# Patient Record
Sex: MALE | Race: WHITE | Employment: UNEMPLOYED | URBAN - METROPOLITAN AREA
[De-identification: names, ages, dates, MRNs, and addresses within clinical notes are randomized per-mention and may not be internally consistent; named-entity substitution may affect disease eponyms.]

---

## 2017-12-08 PROBLEM — K92.1 GASTROINTESTINAL HEMORRHAGE WITH MELENA: Status: ACTIVE | Noted: 2017-12-08

## 2017-12-08 PROBLEM — D72.829 LEUKOCYTOSIS: Status: ACTIVE | Noted: 2017-12-08

## 2017-12-08 PROBLEM — R04.0 EPISTAXIS: Status: ACTIVE | Noted: 2017-12-08

## 2018-10-23 ENCOUNTER — APPOINTMENT (OUTPATIENT)
Dept: CT IMAGING | Age: 23
DRG: 885 | End: 2018-10-23
Payer: MEDICARE

## 2018-10-23 ENCOUNTER — HOSPITAL ENCOUNTER (INPATIENT)
Age: 23
LOS: 3 days | Discharge: HOME | DRG: 885 | End: 2018-10-27
Attending: EMERGENCY MEDICINE | Admitting: PSYCHIATRY & NEUROLOGY
Payer: MEDICARE

## 2018-10-23 DIAGNOSIS — S09.90XA INJURY OF HEAD, INITIAL ENCOUNTER: ICD-10-CM

## 2018-10-23 DIAGNOSIS — F41.1 ANXIETY STATE: ICD-10-CM

## 2018-10-23 DIAGNOSIS — J01.90 ACUTE SINUSITIS, RECURRENCE NOT SPECIFIED, UNSPECIFIED LOCATION: ICD-10-CM

## 2018-10-23 DIAGNOSIS — R45.851 SUICIDAL IDEATION: Primary | ICD-10-CM

## 2018-10-23 DIAGNOSIS — F31.9 BIPOLAR 1 DISORDER (HCC): ICD-10-CM

## 2018-10-23 LAB
A/G RATIO: 1.7 (ref 1.1–2.2)
ACETAMINOPHEN LEVEL: <5 UG/ML (ref 10–30)
ALBUMIN SERPL-MCNC: 4.9 G/DL (ref 3.4–5)
ALP BLD-CCNC: 87 U/L (ref 40–129)
ALT SERPL-CCNC: 11 U/L (ref 10–40)
AMPHETAMINE SCREEN, URINE: ABNORMAL
ANION GAP SERPL CALCULATED.3IONS-SCNC: 11 MMOL/L (ref 3–16)
AST SERPL-CCNC: 18 U/L (ref 15–37)
BARBITURATE SCREEN URINE: ABNORMAL
BASOPHILS ABSOLUTE: 0.2 K/UL (ref 0–0.2)
BASOPHILS RELATIVE PERCENT: 1.3 %
BENZODIAZEPINE SCREEN, URINE: ABNORMAL
BILIRUB SERPL-MCNC: <0.2 MG/DL (ref 0–1)
BILIRUBIN URINE: NEGATIVE
BLOOD, URINE: NEGATIVE
BUN BLDV-MCNC: 17 MG/DL (ref 7–20)
CALCIUM SERPL-MCNC: 9.6 MG/DL (ref 8.3–10.6)
CANNABINOID SCREEN URINE: POSITIVE
CHLORIDE BLD-SCNC: 99 MMOL/L (ref 99–110)
CLARITY: CLEAR
CO2: 28 MMOL/L (ref 21–32)
COCAINE METABOLITE SCREEN URINE: ABNORMAL
COLOR: YELLOW
CREAT SERPL-MCNC: 0.8 MG/DL (ref 0.9–1.3)
EOSINOPHILS ABSOLUTE: 0.5 K/UL (ref 0–0.6)
EOSINOPHILS RELATIVE PERCENT: 3 %
ETHANOL: NORMAL MG/DL (ref 0–0.08)
GFR AFRICAN AMERICAN: >60
GFR NON-AFRICAN AMERICAN: >60
GLOBULIN: 2.9 G/DL
GLUCOSE BLD-MCNC: 86 MG/DL (ref 70–99)
GLUCOSE URINE: NEGATIVE MG/DL
HCT VFR BLD CALC: 50.3 % (ref 40.5–52.5)
HEMOGLOBIN: 17 G/DL (ref 13.5–17.5)
KETONES, URINE: NEGATIVE MG/DL
LEUKOCYTE ESTERASE, URINE: NEGATIVE
LYMPHOCYTES ABSOLUTE: 3.6 K/UL (ref 1–5.1)
LYMPHOCYTES RELATIVE PERCENT: 21.4 %
Lab: ABNORMAL
MCH RBC QN AUTO: 30.1 PG (ref 26–34)
MCHC RBC AUTO-ENTMCNC: 33.9 G/DL (ref 31–36)
MCV RBC AUTO: 89 FL (ref 80–100)
METHADONE SCREEN, URINE: ABNORMAL
MICROSCOPIC EXAMINATION: NORMAL
MONOCYTES ABSOLUTE: 1 K/UL (ref 0–1.3)
MONOCYTES RELATIVE PERCENT: 5.8 %
NEUTROPHILS ABSOLUTE: 11.7 K/UL (ref 1.7–7.7)
NEUTROPHILS RELATIVE PERCENT: 68.5 %
NITRITE, URINE: NEGATIVE
OPIATE SCREEN URINE: ABNORMAL
OXYCODONE URINE: ABNORMAL
PDW BLD-RTO: 13.7 % (ref 12.4–15.4)
PH UA: 6
PH UA: 6
PHENCYCLIDINE SCREEN URINE: ABNORMAL
PLATELET # BLD: 284 K/UL (ref 135–450)
PMV BLD AUTO: 7.8 FL (ref 5–10.5)
POTASSIUM SERPL-SCNC: 4 MMOL/L (ref 3.5–5.1)
PROPOXYPHENE SCREEN: ABNORMAL
PROTEIN UA: NEGATIVE MG/DL
RBC # BLD: 5.65 M/UL (ref 4.2–5.9)
SALICYLATE, SERUM: <0.3 MG/DL (ref 15–30)
SODIUM BLD-SCNC: 138 MMOL/L (ref 136–145)
SPECIFIC GRAVITY UA: 1.02
TOTAL PROTEIN: 7.8 G/DL (ref 6.4–8.2)
URINE REFLEX TO CULTURE: NORMAL
URINE TYPE: NORMAL
UROBILINOGEN, URINE: 0.2 E.U./DL
WBC # BLD: 17 K/UL (ref 4–11)

## 2018-10-23 PROCEDURE — 85025 COMPLETE CBC W/AUTO DIFF WBC: CPT

## 2018-10-23 PROCEDURE — 80307 DRUG TEST PRSMV CHEM ANLYZR: CPT

## 2018-10-23 PROCEDURE — 81003 URINALYSIS AUTO W/O SCOPE: CPT

## 2018-10-23 PROCEDURE — 99285 EMERGENCY DEPT VISIT HI MDM: CPT

## 2018-10-23 PROCEDURE — 80053 COMPREHEN METABOLIC PANEL: CPT

## 2018-10-23 PROCEDURE — G0480 DRUG TEST DEF 1-7 CLASSES: HCPCS

## 2018-10-23 PROCEDURE — 70450 CT HEAD/BRAIN W/O DYE: CPT

## 2018-10-23 ASSESSMENT — PAIN DESCRIPTION - PAIN TYPE: TYPE: CHRONIC PAIN

## 2018-10-23 ASSESSMENT — ENCOUNTER SYMPTOMS
RESPIRATORY NEGATIVE: 1
EYE DISCHARGE: 0
EYE PAIN: 0
PHOTOPHOBIA: 0
EYE REDNESS: 0
GASTROINTESTINAL NEGATIVE: 1

## 2018-10-23 ASSESSMENT — PATIENT HEALTH QUESTIONNAIRE - PHQ9: SUM OF ALL RESPONSES TO PHQ QUESTIONS 1-9: 18

## 2018-10-23 ASSESSMENT — PAIN DESCRIPTION - LOCATION: LOCATION: ABDOMEN;FLANK

## 2018-10-24 PROBLEM — F31.9 BIPOLAR 1 DISORDER (HCC): Status: ACTIVE | Noted: 2018-10-24

## 2018-10-24 LAB
EKG ATRIAL RATE: 67 BPM
EKG DIAGNOSIS: NORMAL
EKG P AXIS: 67 DEGREES
EKG P-R INTERVAL: 154 MS
EKG Q-T INTERVAL: 374 MS
EKG QRS DURATION: 86 MS
EKG QTC CALCULATION (BAZETT): 395 MS
EKG R AXIS: 80 DEGREES
EKG T AXIS: 62 DEGREES
EKG VENTRICULAR RATE: 67 BPM

## 2018-10-24 PROCEDURE — 93005 ELECTROCARDIOGRAM TRACING: CPT | Performed by: NURSE PRACTITIONER

## 2018-10-24 PROCEDURE — 93010 ELECTROCARDIOGRAM REPORT: CPT | Performed by: INTERNAL MEDICINE

## 2018-10-24 PROCEDURE — 1240000000 HC EMOTIONAL WELLNESS R&B

## 2018-10-24 PROCEDURE — 6370000000 HC RX 637 (ALT 250 FOR IP): Performed by: PSYCHIATRY & NEUROLOGY

## 2018-10-24 PROCEDURE — 6360000002 HC RX W HCPCS: Performed by: PSYCHIATRY & NEUROLOGY

## 2018-10-24 PROCEDURE — 6370000000 HC RX 637 (ALT 250 FOR IP): Performed by: NURSE PRACTITIONER

## 2018-10-24 PROCEDURE — 99222 1ST HOSP IP/OBS MODERATE 55: CPT | Performed by: PHYSICIAN ASSISTANT

## 2018-10-24 PROCEDURE — 99223 1ST HOSP IP/OBS HIGH 75: CPT | Performed by: PSYCHIATRY & NEUROLOGY

## 2018-10-24 RX ORDER — OXYMETAZOLINE HYDROCHLORIDE 0.05 G/100ML
2 SPRAY NASAL PRN
Status: DISCONTINUED | OUTPATIENT
Start: 2018-10-24 | End: 2018-10-27 | Stop reason: HOSPADM

## 2018-10-24 RX ORDER — OLANZAPINE 5 MG/1
5 TABLET ORAL 2 TIMES DAILY PRN
Status: DISCONTINUED | OUTPATIENT
Start: 2018-10-24 | End: 2018-10-27 | Stop reason: HOSPADM

## 2018-10-24 RX ORDER — MAGNESIUM HYDROXIDE/ALUMINUM HYDROXICE/SIMETHICONE 120; 1200; 1200 MG/30ML; MG/30ML; MG/30ML
30 SUSPENSION ORAL EVERY 6 HOURS PRN
Status: DISCONTINUED | OUTPATIENT
Start: 2018-10-24 | End: 2018-10-27 | Stop reason: HOSPADM

## 2018-10-24 RX ORDER — TRAZODONE HYDROCHLORIDE 50 MG/1
50 TABLET ORAL NIGHTLY PRN
Status: DISCONTINUED | OUTPATIENT
Start: 2018-10-24 | End: 2018-10-27 | Stop reason: HOSPADM

## 2018-10-24 RX ORDER — NICOTINE 21 MG/24HR
1 PATCH, TRANSDERMAL 24 HOURS TRANSDERMAL DAILY PRN
Status: DISCONTINUED | OUTPATIENT
Start: 2018-10-24 | End: 2018-10-27 | Stop reason: HOSPADM

## 2018-10-24 RX ORDER — ACETAMINOPHEN 325 MG/1
650 TABLET ORAL EVERY 4 HOURS PRN
Status: DISCONTINUED | OUTPATIENT
Start: 2018-10-24 | End: 2018-10-27 | Stop reason: HOSPADM

## 2018-10-24 RX ORDER — HYDROXYZINE PAMOATE 50 MG/1
50 CAPSULE ORAL 3 TIMES DAILY PRN
Status: DISCONTINUED | OUTPATIENT
Start: 2018-10-24 | End: 2018-10-27 | Stop reason: HOSPADM

## 2018-10-24 RX ADMIN — HYDROXYZINE PAMOATE 50 MG: 50 CAPSULE ORAL at 15:59

## 2018-10-24 RX ADMIN — NICOTINE POLACRILEX 2 MG: 2 GUM, CHEWING BUCCAL at 20:51

## 2018-10-24 RX ADMIN — NICOTINE POLACRILEX 2 MG: 2 GUM, CHEWING BUCCAL at 14:31

## 2018-10-24 RX ADMIN — OLANZAPINE 5 MG: 5 TABLET, FILM COATED ORAL at 20:51

## 2018-10-24 RX ADMIN — HYDROXYZINE PAMOATE 50 MG: 50 CAPSULE ORAL at 05:16

## 2018-10-24 RX ADMIN — PALIPERIDONE PALMITATE 234 MG: 234 INJECTION INTRAMUSCULAR at 15:59

## 2018-10-24 RX ADMIN — TRAZODONE HYDROCHLORIDE 50 MG: 50 TABLET ORAL at 20:51

## 2018-10-24 ASSESSMENT — SLEEP AND FATIGUE QUESTIONNAIRES
DIFFICULTY FALLING ASLEEP: YES
DIFFICULTY STAYING ASLEEP: YES
DO YOU USE A SLEEP AID: NO
AVERAGE NUMBER OF SLEEP HOURS: 3
DO YOU HAVE DIFFICULTY SLEEPING: YES
DO YOU HAVE DIFFICULTY SLEEPING: YES
DIFFICULTY FALLING ASLEEP: YES
RESTFUL SLEEP: NO
SLEEP PATTERN: DIFFICULTY FALLING ASLEEP;DISTURBED/INTERRUPTED SLEEP;EARLY AWAKENING;NIGHTMARES/TERRORS
DIFFICULTY ARISING: NO
DIFFICULTY ARISING: NO
DO YOU USE A SLEEP AID: NO
SLEEP PATTERN: DIFFICULTY FALLING ASLEEP;DISTURBED/INTERRUPTED SLEEP;RESTLESSNESS
DIFFICULTY STAYING ASLEEP: YES
RESTFUL SLEEP: NO
AVERAGE NUMBER OF SLEEP HOURS: 1

## 2018-10-24 ASSESSMENT — LIFESTYLE VARIABLES
HISTORY_ALCOHOL_USE: NO
HISTORY_ALCOHOL_USE: YES

## 2018-10-24 ASSESSMENT — PATIENT HEALTH QUESTIONNAIRE - PHQ9
SUM OF ALL RESPONSES TO PHQ QUESTIONS 1-9: 22
SUM OF ALL RESPONSES TO PHQ QUESTIONS 1-9: 18

## 2018-10-24 ASSESSMENT — PAIN SCALES - GENERAL: PAINLEVEL_OUTOF10: 0

## 2018-10-24 NOTE — ED NOTES
Patient [facility] and cooperative. Shown to room BH3, explained process. No questions.       Govind Singleton RN  10/24/18 8736

## 2018-10-24 NOTE — PLAN OF CARE
Problem: Altered Mood, Psychotic Behavior:  Goal: Able to verbalize reality based thinking  Able to verbalize reality based thinking   Outcome: Ongoing                                                                      Group Therapy Note    Date: 10/24/2018  Start Time: 2:30PM  End Time:  3:30PM  Number of Participants: 5    Type of Group: Relapse Prevention    Patient's Goal:  Patients were guided in mindfulness stretching, breathing, and practiced mindfully listening to music during group. Group discussed using mindfulness and listening to music as coping skills and strategies. Notes:  Pt was engaged in group activity and then reported not feeling well stating he was nauseous. Pt left group to rest and was encouraged to go speak with nurse. Status After Intervention:  Improved    Participation Level:  Active Listener and Interactive    Participation Quality: Appropriate and Attentive      Speech:  normal      Thought Process/Content: Logical      Affective Functioning: Flat      Mood: depressed      Level of consciousness:  Alert and Oriented x4      Response to Learning: Progressing to goal      Endings: None Reported    Modes of Intervention: Education, Support, Socialization, Exploration, Activity and Movement      Discipline Responsible: /Counselor      Signature:  RIKI Lincoln, CRISS

## 2018-10-24 NOTE — ED PROVIDER NOTES
below     Oxycodone Urine Neg Negative <100 ng/ml   Urine, reflex to culture   Result Value Ref Range    Color, UA Yellow Straw/Yellow    Clarity, UA Clear Clear    Glucose, Ur Negative Negative mg/dL    Bilirubin Urine Negative Negative    Ketones, Urine Negative Negative mg/dL    Specific Gravity, UA 1.025 1.005 - 1.030    Blood, Urine Negative Negative    pH, UA 6.0 5.0 - 8.0    Protein, UA Negative Negative mg/dL    Urobilinogen, Urine 0.2 <2.0 E.U./dL    Nitrite, Urine Negative Negative    Leukocyte Esterase, Urine Negative Negative    Microscopic Examination Not Indicated     Urine Reflex to Culture Not Indicated     Urine Type Not Specified    CBC Auto Differential   Result Value Ref Range    WBC 10.0 4.0 - 11.0 K/uL    RBC 5.42 4.20 - 5.90 M/uL    Hemoglobin 16.5 13.5 - 17.5 g/dL    Hematocrit 48.2 40.5 - 52.5 %    MCV 88.9 80.0 - 100.0 fL    MCH 30.4 26.0 - 34.0 pg    MCHC 34.2 31.0 - 36.0 g/dL    RDW 13.6 12.4 - 15.4 %    Platelets 785 919 - 255 K/uL    MPV 8.0 5.0 - 10.5 fL    PLATELET SLIDE REVIEW Adequate     SLIDE REVIEW see below     Neutrophils % 52.0 %    Lymphocytes % 33.0 %    Monocytes % 5.0 %    Eosinophils % 5.0 %    Basophils % 2.0 %    Neutrophils # 5.2 1.7 - 7.7 K/uL    Lymphocytes # 3.6 1.0 - 5.1 K/uL    Monocytes # 0.5 0.0 - 1.3 K/uL    Eosinophils # 0.5 0.0 - 0.6 K/uL    Basophils # 0.2 0.0 - 0.2 K/uL    Atypical Lymphocytes Relative 3 0 - 6 %    Anisocytosis Occasional (A)     Poikilocytes 1+ (A)    Lipase   Result Value Ref Range    Lipase 31.0 13.0 - 60.0 U/L   Hepatic Function Panel   Result Value Ref Range    Total Protein 6.9 6.4 - 8.2 g/dL    Alb 4.3 3.4 - 5.0 g/dL    Alkaline Phosphatase 76 40 - 129 U/L    ALT 10 10 - 40 U/L    AST 17 15 - 37 U/L    Total Bilirubin <0.2 0.0 - 1.0 mg/dL    Bilirubin, Direct <0.2 0.0 - 0.3 mg/dL    Bilirubin, Indirect see below 0.0 - 1.0 mg/dL   EKG 12 lead   Result Value Ref Range    Ventricular Rate 67 BPM    Atrial Rate 67 BPM    P-R Interval 154

## 2018-10-24 NOTE — BH NOTE
regularly since then. He hears a nonhuman voice in the back of his head telling him to hurt himself and others, as well as telling him there is nowhere to go, and he has no control of himself. He stated he does not want to act on the voices, but he has an \"urge\" to do it. He stated he does not feel safe with himself. He stated he has impulsively acted on the command hallucinations in the past, and he is afraid he will do so again. These voices and pt's mood has gotten so bad the past couple weeks, so he had a relative lock up his guns, and he sent his girlfriend and child away from his apartment. He has never been violent toward his girlfriend or child, but he feels that he is a growing risk to them. He stated he has been avoiding interactions with others, because he is afraid he will not be able to control himself and will impulsively act on the voices and hurt someone. He was trying to ignore the command hallucinations earlier this evening and asked family to bring him to the [facility]. Nobody would, so he called 911 and had himself brought in for treatment. He repeatedly hit his face against a fence before the police arrived and still felt the urge to try hurting himself once he arrived in the ER. He is compliant and states that he wants to get help, so he can be there for his daughter, nieces and nephews. He reports he has only been sleeping less than 20 mins a day for the past 1 1/2 weeks, because he feels restless and full of energy and just can't relax. He stated he has not been eating much and has not eaten at all in the past couple of days, because the voices in his head tells him he can't eat. He stated he was able to eat the food given to him here in the ED, but it is not settling well in his stomach. He stated he does not know why he was able to eat the food here (\"I don't know; I just ate it\"). He stated he has been showering significantly more lately.  He typically takes up to 2 showers a day, but lately he has been showering 5 or more times each day.

## 2018-10-24 NOTE — H&P
Hospital Medicine History & Physical      PCP: No primary care provider on file. Date of Admission: 10/23/2018    Date of Service: Pt seen/examined on 10/24/2018    Chief Complaint:    Chief Complaint   Patient presents with    Suicide Attempt     pt brought in by police; pt called police and told them that he was suicidal and didn't care how he would kill himself; \"I kill myself by any means possible\"; pt was afraid that he would hurt his daughter while he was killing himself    Facial Injury     when police arrived; pt hit his face several times on a post         History Of Present Illness: The patient is a 26 y.o. male with ADHD, borderline personality disorder, depression and schizophrenia who presented to [facility] with complaint of SI. Patient was seen and evaluated in the ED by the ED medical provider, patient was medically cleared for admission to [facility]. This note serves as an admission medical H&P.    PCP: none  Tobacco use: yes  ETOH use: denies  Illicit drug use: denies    Patient just had a nose bleed, states its controlled now     Past Medical History:        Diagnosis Date    ADHD (attention deficit hyperactivity disorder)     [facility]     Borderline personality disorder ([facility] [facility]     Depression     Schizophrenia ([facility] [address]        Past Surgical History:        Procedure Laterality Date    HAND SURGERY         Medications Prior to Admission:    Prior to Admission medications    Medication Sig Start Date End Date Taking? Authorizing Provider   pantoprazole (PROTONIX) 20 MG tablet Take 1 tablet by mouth daily 12/8/17   Art Lock MD       Allergies:  Morphine    Social History:  The patient currently lives at home     TOBACCO:   reports that he has been smoking Cigarettes. He has a 0.25 pack-year smoking history. He has never used smokeless tobacco.  ETOH:   reports that he does not drink alcohol.       Family History:   Positive as follows:    Family History   Problem Relation 72 hours. APTT: No results for input(s): APTT in the last 72 hours. UA:  Recent Labs      10/23/18   2203   COLORU  Yellow   PHUR  6.0  6.0   CLARITYU  Clear   SPECGRAV  1.025   LEUKOCYTESUR  Negative   UROBILINOGEN  0.2   BILIRUBINUR  Negative   BLOODU  Negative   GLUCOSEU  Negative          U/A:    Lab Results   Component Value Date    COLORU Yellow 10/23/2018    CLARITYU Clear 10/23/2018    SPECGRAV 1.025 10/23/2018    LEUKOCYTESUR Negative 10/23/2018    BLOODU Negative 10/23/2018    GLUCOSEU Negative 10/23/2018       CULTURES  None     EKG:  I have reviewed the EKG with the following interpretation:   NSR, normal intervals, mild ST elevation appears to be associated with early repolarization    RADIOLOGY  CT Head WO Contrast   Final Result   No acute intracranial abnormality.                ASSESSMENT/PLAN:  BAD  Borderline Personality Disorder  - per psychiatry team    Abdominal Pain   - Repeat labs   - Normal on admission   - low concerns for acute intra-abdominal pathology   - Normal BMs and tolerating PO     Abnormal EKG   - Low concerns for ACS or pericarditis, Asx and no risk factors   - Appears consistent with early repolarization, will continue to monitor closely      Leukocytosis   - Repeat labs     Epistaxis   - Stable   - no signs of posterior bleed  - Add afrin prn     Tobacco Abuse  - Nicorette gum   - Counseled on cessation       Evgeny Branham PA-C  10/24/2018 2:00 PM

## 2018-10-25 LAB
ALBUMIN SERPL-MCNC: 4.3 G/DL (ref 3.4–5)
ALP BLD-CCNC: 76 U/L (ref 40–129)
ALT SERPL-CCNC: 10 U/L (ref 10–40)
ANISOCYTOSIS: ABNORMAL
AST SERPL-CCNC: 17 U/L (ref 15–37)
ATYPICAL LYMPHOCYTE RELATIVE PERCENT: 3 % (ref 0–6)
BASOPHILS ABSOLUTE: 0.2 K/UL (ref 0–0.2)
BASOPHILS RELATIVE PERCENT: 2 %
BILIRUB SERPL-MCNC: <0.2 MG/DL (ref 0–1)
BILIRUBIN DIRECT: <0.2 MG/DL (ref 0–0.3)
BILIRUBIN, INDIRECT: NORMAL MG/DL (ref 0–1)
EOSINOPHILS ABSOLUTE: 0.5 K/UL (ref 0–0.6)
EOSINOPHILS RELATIVE PERCENT: 5 %
HCT VFR BLD CALC: 48.2 % (ref 40.5–52.5)
HEMOGLOBIN: 16.5 G/DL (ref 13.5–17.5)
LIPASE: 31 U/L (ref 13–60)
LYMPHOCYTES ABSOLUTE: 3.6 K/UL (ref 1–5.1)
LYMPHOCYTES RELATIVE PERCENT: 33 %
MCH RBC QN AUTO: 30.4 PG (ref 26–34)
MCHC RBC AUTO-ENTMCNC: 34.2 G/DL (ref 31–36)
MCV RBC AUTO: 88.9 FL (ref 80–100)
MONOCYTES ABSOLUTE: 0.5 K/UL (ref 0–1.3)
MONOCYTES RELATIVE PERCENT: 5 %
NEUTROPHILS ABSOLUTE: 5.2 K/UL (ref 1.7–7.7)
NEUTROPHILS RELATIVE PERCENT: 52 %
PDW BLD-RTO: 13.6 % (ref 12.4–15.4)
PLATELET # BLD: 243 K/UL (ref 135–450)
PLATELET SLIDE REVIEW: ADEQUATE
PMV BLD AUTO: 8 FL (ref 5–10.5)
POIKILOCYTES: ABNORMAL
RBC # BLD: 5.42 M/UL (ref 4.2–5.9)
SLIDE REVIEW: ABNORMAL
TOTAL PROTEIN: 6.9 G/DL (ref 6.4–8.2)
WBC # BLD: 10 K/UL (ref 4–11)

## 2018-10-25 PROCEDURE — 85025 COMPLETE CBC W/AUTO DIFF WBC: CPT

## 2018-10-25 PROCEDURE — 80076 HEPATIC FUNCTION PANEL: CPT

## 2018-10-25 PROCEDURE — 6370000000 HC RX 637 (ALT 250 FOR IP): Performed by: PSYCHIATRY & NEUROLOGY

## 2018-10-25 PROCEDURE — 83690 ASSAY OF LIPASE: CPT

## 2018-10-25 PROCEDURE — 6370000000 HC RX 637 (ALT 250 FOR IP): Performed by: NURSE PRACTITIONER

## 2018-10-25 PROCEDURE — 36415 COLL VENOUS BLD VENIPUNCTURE: CPT

## 2018-10-25 PROCEDURE — 1240000000 HC EMOTIONAL WELLNESS R&B

## 2018-10-25 RX ADMIN — NICOTINE POLACRILEX 2 MG: 2 GUM, CHEWING BUCCAL at 15:43

## 2018-10-25 RX ADMIN — OLANZAPINE 5 MG: 5 TABLET, FILM COATED ORAL at 20:43

## 2018-10-25 RX ADMIN — NICOTINE POLACRILEX 2 MG: 2 GUM, CHEWING BUCCAL at 19:11

## 2018-10-25 RX ADMIN — TRAZODONE HYDROCHLORIDE 50 MG: 50 TABLET ORAL at 20:43

## 2018-10-25 NOTE — H&P
been violent towards them, but he  feels there is a growing risk to them because of his thoughts. He was  trying to ignore the command hallucinations and asked the family to  bring him to the hospital, but nobody wants to call 911. He states he  has been sleeping 20 minutes a day for the past couple of weeks and  feels restless and cannot relax. He states that his appetite has been  poor because the voices in the head had told him not to eat. PRIOR PSYCHIATRIC HISTORY:  Inpatient [facility] 12/09/2014 to 12/15/2014. He was at [facility] in 10/2014. He has been to [employer] as a teenager. He has not been to any outpatient, but had been  to [facility] as a teenager. FAMILY PSYCH HISTORY:  History of abuse in the family setting. Father  is alcoholic, domestic violence. SOCIAL HISTORY:  Lives with his aunt and cousin in [place]. DRUGS AND ALCOHOL:  He denies any use of cigarettes. TRAUMA HISTORY:  Denies any trauma towards him. FAMILY HISTORY:  Family history of suicides. A 34year-old cousin  attempted a couple of weeks ago. CURRENT MEDICATIONS:  None. MEDICAL HISTORY:  He states he is healthy. ALLERGIES TO MEDS:  MORPHINE.    LEGAL ISSUES:  He denied. REVIEW OF SYSTEMS:  Pertinent positives as mentioned in the HPI,  otherwise negative. PHYSICAL EXAMINATION:  Per CHERRI Gramajo, on 10/24/2018. VITAL SIGNS:  Noted. LABORATORY DATA:  Laboratories reviewed. Drug screen was negative and  no alcohol. MENTAL STATUS EXAMINATION:  The patient is a 25-year-old white male  who is cooperative. He appeared to be somewhat antsy and fidgety. He  had difficulty sitting still. He said his mood was okay. Affect was  elevated. No abnormal movements. Normal gait. Fund of knowledge and  language were fair. Attention and concentration was adequate. He was  able to recall three objects immediately.   He denied being homicidal,  but states he

## 2018-10-25 NOTE — PLAN OF CARE
Problem: Altered Mood, Psychotic Behavior:  Goal: Absence of self-harm  Absence of self-harm   Outcome: Ongoing  Pt denies SI and has been safe throughout the shift. Goal: Patient specific goal  Patient specific goal   Outcome: Ongoing  Pt set goal for the day of ignoring negative thoughts and attending groups. Pt met both of these goals stating he has not had any thoughts telling him to harm himself and attended psychotherapy. Pt states he will continue to try and ignore these negative thoughts.

## 2018-10-25 NOTE — PLAN OF CARE
Problem: Altered Mood, Psychotic Behavior:  Goal: Able to verbalize reality based thinking  Able to verbalize reality based thinking   Outcome: Ongoing                                                                      Group Therapy Note    Date: 10/25/2018  Start Time: 11:05 am   End Time:  11:55 am   Number of Participants: 10     Type of Group: Psychotherapy     Patient's Goal:  Pt will improve interpersonal interactions through group processing and agenda setting.      Notes:  Pt participated in group discussion, provided supportive feedback, and addressed his agenda within the group. Status After Intervention:  Unchanged    Participation Level:  Active Listener and Interactive    Participation Quality: Appropriate, Attentive and Sharing      Speech:  normal      Thought Process/Content: Linear      Affective Functioning: Congruent      Mood: euthymic      Level of consciousness:  Alert      Response to Learning: Able to verbalize current knowledge/experience      Endings: None Reported    Modes of Intervention: Education, Support, Socialization, Exploration, Clarifying, Problem-solving, Activity and Movement      Discipline Responsible: /Counselor      Signature:  Loyda Stokes, [facility]

## 2018-10-26 VITALS
BODY MASS INDEX: 21.13 KG/M2 | WEIGHT: 156 LBS | RESPIRATION RATE: 16 BRPM | SYSTOLIC BLOOD PRESSURE: 125 MMHG | OXYGEN SATURATION: 96 % | HEART RATE: 88 BPM | DIASTOLIC BLOOD PRESSURE: 70 MMHG | TEMPERATURE: 98.5 F | HEIGHT: 72 IN

## 2018-10-26 PROBLEM — F39 PSYCHOTIC AFFECTIVE DISORDER (HCC): Status: ACTIVE | Noted: 2018-10-26

## 2018-10-26 PROCEDURE — 99233 SBSQ HOSP IP/OBS HIGH 50: CPT | Performed by: PSYCHIATRY & NEUROLOGY

## 2018-10-26 PROCEDURE — 6370000000 HC RX 637 (ALT 250 FOR IP): Performed by: NURSE PRACTITIONER

## 2018-10-26 PROCEDURE — 1240000000 HC EMOTIONAL WELLNESS R&B

## 2018-10-26 PROCEDURE — 6370000000 HC RX 637 (ALT 250 FOR IP): Performed by: PSYCHIATRY & NEUROLOGY

## 2018-10-26 RX ORDER — NICOTINE 21 MG/24HR
1 PATCH, TRANSDERMAL 24 HOURS TRANSDERMAL DAILY PRN
Qty: 30 PATCH | Refills: 3 | Status: ON HOLD | OUTPATIENT
Start: 2018-10-26 | End: 2019-05-21 | Stop reason: HOSPADM

## 2018-10-26 RX ADMIN — TRAZODONE HYDROCHLORIDE 50 MG: 50 TABLET ORAL at 21:12

## 2018-10-26 RX ADMIN — NICOTINE POLACRILEX 2 MG: 2 GUM, CHEWING BUCCAL at 11:51

## 2018-10-26 RX ADMIN — NICOTINE POLACRILEX 2 MG: 2 GUM, CHEWING BUCCAL at 09:01

## 2018-10-26 RX ADMIN — NICOTINE POLACRILEX 2 MG: 2 GUM, CHEWING BUCCAL at 13:58

## 2018-10-26 RX ADMIN — NICOTINE POLACRILEX 2 MG: 2 GUM, CHEWING BUCCAL at 17:30

## 2018-10-26 RX ADMIN — NICOTINE POLACRILEX 2 MG: 2 GUM, CHEWING BUCCAL at 19:23

## 2018-10-26 NOTE — PROGRESS NOTES
mg/dL  Toxic: >30.0 mg/dL      Acetaminophen Level 10/23/2018 <5* 10 - 30 ug/mL Final    Comment: Therapeutic Range: 10.0-30.0 ug/mL  Toxic: >200.0 ug/mL      Amphetamine Screen, Urine 10/23/2018 Neg  Negative <1000ng/mL Final    Barbiturate Screen, Ur 10/23/2018 Neg  Negative <200 ng/mL Final    Benzodiazepine Screen, Urine 10/23/2018 Neg  Negative <200 ng/mL Final    Cannabinoid Scrn, Ur 10/23/2018 POSITIVE* Negative <50 ng/mL Final    Cocaine Metabolite Screen, Urine 10/23/2018 Neg  Negative <300 ng/mL Final    Opiate Scrn, Ur 10/23/2018 Neg  Negative <300 ng/mL Final    Comment: \"Therapeutic levels of pain medication, especially oxycontin and synthetic  opioids, may not be detected by this Methodology. Pain management screen  panel  Drug panel-PM-Hi Res Ur, Interp (PAIN) should be considered for drug  monitoring \".  PCP Screen, Urine 10/23/2018 Neg  Negative <25 ng/mL Final    Methadone Screen, Urine 10/23/2018 Neg  Negative <300 ng/mL Final    Propoxyphene Scrn, Ur 10/23/2018 Neg  Negative <300 ng/mL Final    pH, UA 10/23/2018 6.0   Final    Comment: Urine pH less than 5.0 or greater than 8.0 may indicate sample adulteration. Another sample should be collected if clinically  indicated.  Drug Screen Comment: 10/23/2018 see below   Final    Comment: This method is a screening test to detect only these drug  classes as part of a medical workup. Confirmatory testing  by another method should be ordered if clinically indicated.       Oxycodone Urine 10/23/2018 Neg  Negative <100 ng/ml Final    Color, UA 10/23/2018 Yellow  Straw/Yellow Final    Clarity, UA 10/23/2018 Clear  Clear Final    Glucose, Ur 10/23/2018 Negative  Negative mg/dL Final    Bilirubin Urine 10/23/2018 Negative  Negative Final    Ketones, Urine 10/23/2018 Negative  Negative mg/dL Final    Specific Gravity, UA 10/23/2018 1.025  1.005 - 1.030 Final    Blood, Urine 10/23/2018 Negative  Negative Final    pH, UA 10/23/2018 mL, 30 mL, Oral, Daily PRN  aluminum & magnesium hydroxide-simethicone (MAALOX) 200-200-20 MG/5ML suspension 30 mL, 30 mL, Oral, Q6H PRN  nicotine (NICODERM CQ) 21 MG/24HR 1 patch, 1 patch, Transdermal, Daily PRN  nicotine polacrilex (NICORETTE) gum 2 mg, 2 mg, Oral, Q2H PRN  traZODone (DESYREL) tablet 50 mg, 50 mg, Oral, Nightly PRN  oxymetazoline (AFRIN) 0.05 % nasal spray 2 spray, 2 spray, Nasal, PRN    ASSESSMENT AND PLAN    Principal Problem:    Bipolar I disorder, most recent episode (or current) depressed, severe, specified as with psychotic behavior ([facility] [address]  Active Problems:    Borderline personality disorder (HCC)    Suicidal ideation    Abnormal EKG    Tobacco abuse    Abdominal pain  Resolved Problems:    * No resolved hospital problems. *       1. Patient s symptoms   are improving  2. Probable discharge is tomorrow  3. Discharge planning is complete  4. Suicidal ideation is better  5. Total time with patient was 40 minutes and more than 50 % of that time was spent counseling the patient on their symptoms, treatment and expected goals.

## 2018-10-27 PROCEDURE — 6360000002 HC RX W HCPCS: Performed by: PSYCHIATRY & NEUROLOGY

## 2018-10-27 PROCEDURE — 6370000000 HC RX 637 (ALT 250 FOR IP): Performed by: NURSE PRACTITIONER

## 2018-10-27 RX ADMIN — NICOTINE POLACRILEX 2 MG: 2 GUM, CHEWING BUCCAL at 08:38

## 2018-10-27 RX ADMIN — PALIPERIDONE PALMITATE 156 MG: 156 INJECTION INTRAMUSCULAR at 08:32

## 2018-10-27 NOTE — PLAN OF CARE
Problem: Altered Mood, Psychotic Behavior:  Goal: Compliance with prescribed medication regimen will improve  Compliance with prescribed medication regimen will improve   Outcome: Ongoing  Patient compliant with invega injection. Patient being discharged tomorrow. Discharge summary gone over with patient. Patient verbalizes an understanding. Will continue to monitor.

## 2018-10-31 ENCOUNTER — HOSPITAL ENCOUNTER (INPATIENT)
Age: 23
LOS: 2 days | Discharge: HOME | DRG: 885 | End: 2018-11-02
Attending: EMERGENCY MEDICINE | Admitting: PSYCHIATRY & NEUROLOGY
Payer: MEDICARE

## 2018-10-31 DIAGNOSIS — F33.3 SEVERE EPISODE OF RECURRENT MAJOR DEPRESSIVE DISORDER, WITH PSYCHOTIC FEATURES (HCC): ICD-10-CM

## 2018-10-31 DIAGNOSIS — R45.851 SUICIDAL IDEATION: Primary | ICD-10-CM

## 2018-10-31 PROBLEM — F33.9 MDD (MAJOR DEPRESSIVE DISORDER), RECURRENT EPISODE (HCC): Status: ACTIVE | Noted: 2018-10-31

## 2018-10-31 LAB
A/G RATIO: 1.9 (ref 1.1–2.2)
ALBUMIN SERPL-MCNC: 4.8 G/DL (ref 3.4–5)
ALP BLD-CCNC: 70 U/L (ref 40–129)
ALT SERPL-CCNC: 30 U/L (ref 10–40)
AMPHETAMINE SCREEN, URINE: ABNORMAL
ANION GAP SERPL CALCULATED.3IONS-SCNC: 13 MMOL/L (ref 3–16)
AST SERPL-CCNC: 29 U/L (ref 15–37)
BARBITURATE SCREEN URINE: ABNORMAL
BASOPHILS ABSOLUTE: 0.1 K/UL (ref 0–0.2)
BASOPHILS RELATIVE PERCENT: 0.6 %
BENZODIAZEPINE SCREEN, URINE: ABNORMAL
BILIRUB SERPL-MCNC: <0.2 MG/DL (ref 0–1)
BUN BLDV-MCNC: 13 MG/DL (ref 7–20)
CALCIUM SERPL-MCNC: 9.3 MG/DL (ref 8.3–10.6)
CANNABINOID SCREEN URINE: POSITIVE
CHLORIDE BLD-SCNC: 98 MMOL/L (ref 99–110)
CO2: 30 MMOL/L (ref 21–32)
COCAINE METABOLITE SCREEN URINE: ABNORMAL
CREAT SERPL-MCNC: 0.7 MG/DL (ref 0.9–1.3)
EOSINOPHILS ABSOLUTE: 0.4 K/UL (ref 0–0.6)
EOSINOPHILS RELATIVE PERCENT: 3.1 %
ETHANOL: NORMAL MG/DL (ref 0–0.08)
GFR AFRICAN AMERICAN: >60
GFR NON-AFRICAN AMERICAN: >60
GLOBULIN: 2.5 G/DL
GLUCOSE BLD-MCNC: 110 MG/DL (ref 70–99)
HCT VFR BLD CALC: 48.1 % (ref 40.5–52.5)
HEMOGLOBIN: 16.4 G/DL (ref 13.5–17.5)
LYMPHOCYTES ABSOLUTE: 2.5 K/UL (ref 1–5.1)
LYMPHOCYTES RELATIVE PERCENT: 20.4 %
Lab: ABNORMAL
MCH RBC QN AUTO: 30.3 PG (ref 26–34)
MCHC RBC AUTO-ENTMCNC: 34 G/DL (ref 31–36)
MCV RBC AUTO: 89.1 FL (ref 80–100)
METHADONE SCREEN, URINE: ABNORMAL
MONOCYTES ABSOLUTE: 1 K/UL (ref 0–1.3)
MONOCYTES RELATIVE PERCENT: 7.9 %
NEUTROPHILS ABSOLUTE: 8.2 K/UL (ref 1.7–7.7)
NEUTROPHILS RELATIVE PERCENT: 68 %
OPIATE SCREEN URINE: ABNORMAL
OXYCODONE URINE: ABNORMAL
PDW BLD-RTO: 14.2 % (ref 12.4–15.4)
PH UA: 7
PHENCYCLIDINE SCREEN URINE: ABNORMAL
PLATELET # BLD: 244 K/UL (ref 135–450)
PMV BLD AUTO: 7.5 FL (ref 5–10.5)
POTASSIUM SERPL-SCNC: 4.1 MMOL/L (ref 3.5–5.1)
PROPOXYPHENE SCREEN: ABNORMAL
RBC # BLD: 5.4 M/UL (ref 4.2–5.9)
SODIUM BLD-SCNC: 141 MMOL/L (ref 136–145)
TOTAL PROTEIN: 7.3 G/DL (ref 6.4–8.2)
WBC # BLD: 12.1 K/UL (ref 4–11)

## 2018-10-31 PROCEDURE — 80307 DRUG TEST PRSMV CHEM ANLYZR: CPT

## 2018-10-31 PROCEDURE — 6370000000 HC RX 637 (ALT 250 FOR IP): Performed by: PSYCHIATRY & NEUROLOGY

## 2018-10-31 PROCEDURE — 1240000000 HC EMOTIONAL WELLNESS R&B

## 2018-10-31 PROCEDURE — 80053 COMPREHEN METABOLIC PANEL: CPT

## 2018-10-31 PROCEDURE — 85025 COMPLETE CBC W/AUTO DIFF WBC: CPT

## 2018-10-31 PROCEDURE — 99285 EMERGENCY DEPT VISIT HI MDM: CPT

## 2018-10-31 PROCEDURE — G0480 DRUG TEST DEF 1-7 CLASSES: HCPCS

## 2018-10-31 RX ORDER — TRAZODONE HYDROCHLORIDE 50 MG/1
50 TABLET ORAL NIGHTLY PRN
Status: DISCONTINUED | OUTPATIENT
Start: 2018-10-31 | End: 2018-11-02 | Stop reason: HOSPADM

## 2018-10-31 RX ORDER — MAGNESIUM HYDROXIDE/ALUMINUM HYDROXICE/SIMETHICONE 120; 1200; 1200 MG/30ML; MG/30ML; MG/30ML
30 SUSPENSION ORAL EVERY 6 HOURS PRN
Status: DISCONTINUED | OUTPATIENT
Start: 2018-10-31 | End: 2018-11-02 | Stop reason: HOSPADM

## 2018-10-31 RX ORDER — HALOPERIDOL 5 MG/ML
5 INJECTION INTRAMUSCULAR EVERY 6 HOURS PRN
Status: DISCONTINUED | OUTPATIENT
Start: 2018-10-31 | End: 2018-11-02 | Stop reason: HOSPADM

## 2018-10-31 RX ORDER — HYDROXYZINE PAMOATE 50 MG/1
50 CAPSULE ORAL 3 TIMES DAILY PRN
Status: DISCONTINUED | OUTPATIENT
Start: 2018-10-31 | End: 2018-11-02 | Stop reason: HOSPADM

## 2018-10-31 RX ORDER — IBUPROFEN 400 MG/1
400 TABLET ORAL EVERY 6 HOURS PRN
Status: DISCONTINUED | OUTPATIENT
Start: 2018-10-31 | End: 2018-11-02 | Stop reason: HOSPADM

## 2018-10-31 RX ORDER — NICOTINE 21 MG/24HR
1 PATCH, TRANSDERMAL 24 HOURS TRANSDERMAL DAILY
Status: DISCONTINUED | OUTPATIENT
Start: 2018-10-31 | End: 2018-11-02 | Stop reason: HOSPADM

## 2018-10-31 RX ORDER — ACETAMINOPHEN 325 MG/1
650 TABLET ORAL EVERY 4 HOURS PRN
Status: DISCONTINUED | OUTPATIENT
Start: 2018-10-31 | End: 2018-11-02 | Stop reason: HOSPADM

## 2018-10-31 RX ORDER — BISACODYL 10 MG
10 SUPPOSITORY, RECTAL RECTAL DAILY PRN
Status: DISCONTINUED | OUTPATIENT
Start: 2018-10-31 | End: 2018-11-02 | Stop reason: HOSPADM

## 2018-10-31 RX ADMIN — TRAZODONE HYDROCHLORIDE 50 MG: 50 TABLET ORAL at 20:21

## 2018-10-31 ASSESSMENT — SLEEP AND FATIGUE QUESTIONNAIRES
DIFFICULTY STAYING ASLEEP: YES
RESTFUL SLEEP: NO
SLEEP PATTERN: DIFFICULTY FALLING ASLEEP;DISTURBED/INTERRUPTED SLEEP
DO YOU HAVE DIFFICULTY SLEEPING: YES
DIFFICULTY FALLING ASLEEP: YES
DIFFICULTY ARISING: NO
AVERAGE NUMBER OF SLEEP HOURS: 3
DO YOU USE A SLEEP AID: NO

## 2018-10-31 ASSESSMENT — ENCOUNTER SYMPTOMS
BACK PAIN: 0
VOMITING: 0
COUGH: 0
EYE DISCHARGE: 0
NAUSEA: 0
DIARRHEA: 0
SORE THROAT: 0
ABDOMINAL PAIN: 0

## 2018-10-31 ASSESSMENT — LIFESTYLE VARIABLES: HISTORY_ALCOHOL_USE: YES

## 2018-10-31 ASSESSMENT — PATIENT HEALTH QUESTIONNAIRE - PHQ9: SUM OF ALL RESPONSES TO PHQ QUESTIONS 1-9: 4

## 2018-10-31 ASSESSMENT — PAIN DESCRIPTION - LOCATION: LOCATION: LEG

## 2018-10-31 ASSESSMENT — PAIN DESCRIPTION - PAIN TYPE: TYPE: ACUTE PAIN

## 2018-10-31 ASSESSMENT — PAIN SCALES - GENERAL: PAINLEVEL_OUTOF10: 5

## 2018-10-31 NOTE — ED PROVIDER NOTES
[facility]  eMERGENCY dEPARTMENT eNCOUnter        Pt Name: Juan Carlos Wells  MRN: [id number]  Cecegfjacqueline 1995  Date of evaluation: 10/31/2018  Provider: Sarah Hernandez MD  PCP: No primary care provider on file. ED Attending: Sarah Hernandez MD    CHIEF COMPLAINT       Chief Complaint   Patient presents with   [address] Problem     Suicidal / homicidal ideation       HISTORY OF PRESENT ILLNESS   (Location/Symptom, Timing/Onset, Context/Setting, Quality, Duration, Modifying Factors, Severity)  Note limiting factors. Juan Carlos Wells is a 26 y.o. male who presents to the emergency department complaining of homicidal and suicidal thoughts. Patient was recently discharged. He states that initially he was feeling better however today he began hearing the same voice that has been talking to him telling him to kill his aunt while she slept as well as himself. He plans on stabbing both of them. Patient feels suicidal in addition to hearing the voices telling him to kill himself. As a result police and EMS were summoned who came to the emergency department. Patient denies any physical complaints at this time other than his chronic headache. Nursing Notes were all reviewed and agreed with or any disagreements were addressed  in the HPI. REVIEW OF SYSTEMS    (2-9 systems for level 4, 10 or more for level 5)     Review of Systems   Constitutional: Negative for chills and fever. HENT: Negative for congestion and sore throat. Eyes: Negative for discharge. Respiratory: Negative for cough. Cardiovascular: Negative for chest pain. Gastrointestinal: Negative for abdominal pain, diarrhea, nausea and vomiting. Endocrine: Negative for polydipsia. Genitourinary: Negative for dysuria and urgency. Musculoskeletal: Negative for back pain and myalgias. Skin: Negative for rash. Neurological: Positive for headaches. Negative for weakness.    Hematological: Does not PHYSICAL EXAM    (up to 7 for level 4, 8 or more for level 5)     ED Triage Vitals [10/31/18 1501]   BP Temp Temp Source Pulse Resp SpO2 Height Weight   125/78 97.7 °F (36.5 °C) Oral 98 16 98 % -- --       Physical Exam   Constitutional: He is oriented to person, place, and time. He appears well-developed and well-nourished. No distress. HENT:   Head: Normocephalic and atraumatic. Right Ear: External ear normal.   Left Ear: External ear normal.   Nose: Nose normal.   Mouth/Throat: Oropharynx is clear and moist. No oropharyngeal exudate. Eyes: Pupils are equal, round, and reactive to light. Conjunctivae are normal.   Neck: Normal range of motion. Neck supple. Cardiovascular: Normal rate, regular rhythm, normal heart sounds and intact distal pulses. Exam reveals no gallop and no friction rub. No murmur heard. Pulmonary/Chest: Effort normal and breath sounds normal. No respiratory distress. He has no wheezes. Abdominal: Soft. Bowel sounds are normal. He exhibits no distension. There is no tenderness. There is no rebound and no guarding. Musculoskeletal: Normal range of motion. He exhibits no edema or tenderness. Lymphadenopathy:     He has no cervical adenopathy. Neurological: He is alert and oriented to person, place, and time. No cranial nerve deficit. Skin: Skin is warm and dry. No rash noted. Psychiatric: His speech is normal. Judgment normal. He is hyperactive and actively hallucinating. Thought content is not paranoid and not delusional. Cognition and memory are normal. He exhibits a depressed mood. He expresses homicidal and suicidal ideation. He expresses suicidal plans and homicidal plans.        DIAGNOSTIC RESULTS   LABS:    Results for orders placed or performed during the hospital encounter of 10/31/18   Drug screen multi urine   Result Value Ref Range    Amphetamine Screen, Urine Neg Negative <1000ng/mL    Barbiturate Screen, Ur Neg Negative <200 ng/mL    Benzodiazepine

## 2018-10-31 NOTE — BH NOTE
Pt arrived to [facility] accompanied by ER staff and [facility] police. Patient clothes were changed and pt oriented to [place]. Patient was recently discharged from [facility]. Patient reports he is hearing voices telling him to hurt himself and his aunt. Pt was living with his aunt and states he can return there. Pts father  in  from an OD. Patient reports he hears his fathers voice in his head. Patient was supposed to go to [facility] for follow up but has not been able to go. Patient states his ex-girlfriend was giving meds to help him sleep. Patient reports he still feels suicidal and homicidal. His plan is to stab himself and his aunt. Patient states he is hungry and box lunch and drink was given to pt. Pt pleasant and cooperative.

## 2018-11-01 PROBLEM — F12.10 CANNABIS ABUSE: Status: ACTIVE | Noted: 2018-11-01

## 2018-11-01 PROBLEM — F29 PSYCHOSIS (HCC): Status: ACTIVE | Noted: 2018-10-26

## 2018-11-01 PROCEDURE — 6360000002 HC RX W HCPCS: Performed by: PSYCHIATRY & NEUROLOGY

## 2018-11-01 PROCEDURE — G0008 ADMIN INFLUENZA VIRUS VAC: HCPCS | Performed by: PSYCHIATRY & NEUROLOGY

## 2018-11-01 PROCEDURE — 99222 1ST HOSP IP/OBS MODERATE 55: CPT | Performed by: PHYSICIAN ASSISTANT

## 2018-11-01 PROCEDURE — 6370000000 HC RX 637 (ALT 250 FOR IP): Performed by: PSYCHIATRY & NEUROLOGY

## 2018-11-01 PROCEDURE — 99223 1ST HOSP IP/OBS HIGH 75: CPT | Performed by: PSYCHIATRY & NEUROLOGY

## 2018-11-01 PROCEDURE — 1240000000 HC EMOTIONAL WELLNESS R&B

## 2018-11-01 PROCEDURE — 90686 IIV4 VACC NO PRSV 0.5 ML IM: CPT | Performed by: PSYCHIATRY & NEUROLOGY

## 2018-11-01 RX ORDER — MIRTAZAPINE 15 MG/1
15 TABLET, FILM COATED ORAL NIGHTLY
Status: DISCONTINUED | OUTPATIENT
Start: 2018-11-01 | End: 2018-11-02 | Stop reason: HOSPADM

## 2018-11-01 RX ORDER — BUSPIRONE HYDROCHLORIDE 10 MG/1
10 TABLET ORAL 3 TIMES DAILY
Status: DISCONTINUED | OUTPATIENT
Start: 2018-11-01 | End: 2018-11-02 | Stop reason: HOSPADM

## 2018-11-01 RX ADMIN — BUSPIRONE HYDROCHLORIDE 10 MG: 10 TABLET ORAL at 20:26

## 2018-11-01 RX ADMIN — BUSPIRONE HYDROCHLORIDE 10 MG: 10 TABLET ORAL at 13:43

## 2018-11-01 RX ADMIN — MIRTAZAPINE 15 MG: 15 TABLET, FILM COATED ORAL at 20:25

## 2018-11-01 RX ADMIN — INFLUENZA A VIRUS A/MICHIGAN/45/2015 X-275 (H1N1) ANTIGEN (FORMALDEHYDE INACTIVATED), INFLUENZA A VIRUS A/SINGAPORE/INFIMH-16-0019/2016 IVR-186 (H3N2) ANTIGEN (FORMALDEHYDE INACTIVATED), INFLUENZA B VIRUS B/PHUKET/3073/2013 ANTIGEN (FORMALDEHYDE INACTIVATED), AND INFLUENZA B VIRUS B/MARYLAND/15/2016 BX-69A ANTIGEN (FORMALDEHYDE INACTIVATED) 0.5 ML: 15; 15; 15; 15 INJECTION, SUSPENSION INTRAMUSCULAR at 08:52

## 2018-11-01 NOTE — H&P
Constitutional: Negative for fever   HENT: Negative for sore throat   Eyes: Negative for redness   Respiratory: Negative  for dyspnea, cough   Cardiovascular: Negative for chest pain   Gastrointestinal: Negative for vomiting, diarrhea   Genitourinary: Negative for hematuria   Musculoskeletal: Negative for arthralgias   Skin: Negative for rash   Neurological: Negative for syncope    Hematological: Negative for easy bruising/bleeding   Psychiatric/Behavorial: Per psychiatry team evaluation     PHYSICAL EXAM:    /69   Pulse 101   Temp 98.4 °F (36.9 °C) (Oral)   Resp 14   Ht 6' (1.829 m)   Wt 145 lb (65.8 kg)   SpO2 99%   BMI 19.67 kg/m²     Gen: No distress. Alert. Eyes: PERRL. No sclera icterus. No conjunctival injection. ENT: No discharge. Pharynx clear. Neck: No JVD. No Carotid Bruit. Trachea midline. Resp: No accessory muscle use. No crackles. No wheezes. No rhonchi. CV: Regular rate. Regular rhythm. No murmur. No rub. No edema. GI: Non-tender. Non-distended. Normal bowel sounds. Skin: Warm and dry. No nodule on exposed extremities. No rash on exposed extremities. M/S: No cyanosis. No joint deformity. No clubbing. Neuro: Awake. No focal neurologic deficit on exam.  Cranial nerves II through XII intact. Patient is able to ambulate without difficulty. Psych: Per psychiatry team evaluation     CBC:   Recent Labs      10/31/18   1515   WBC  12.1*   HGB  16.4   HCT  48.1   MCV  89.1   PLT  244     BMP:   Recent Labs      10/31/18   1515   NA  141   K  4.1   CL  98*   CO2  30   BUN  13   CREATININE  0.7*     LIVER PROFILE:   Recent Labs      10/31/18   1515   AST  29   ALT  30   BILITOT  <0.2   ALKPHOS  70     PT/INR: No results for input(s): PROTIME, INR in the last 72 hours. APTT: No results for input(s): APTT in the last 72 hours.   UA:  Recent Labs      10/31/18   1515   PHUR  7.0          U/A:    Lab Results   Component Value Date    COLORU Yellow 10/23/2018    CLARITYU Clear

## 2018-11-01 NOTE — BH NOTE
Group Therapy Note     Date: 11/1/2018  Start Time: 1600  End Time:  1700  Number of Participants: 8     Type of Group: Healthy Living/Wellness        Patient's Goal:  To learn about medications and potential side effects     Notes:  Medication BINGO     Status After Intervention:  Improved     Participation Level:  Active Listener     Participation Quality: Appropriate, Attentive and Sharing        Speech:  normal        Thought Process/Content: Logical  Linear        Affective Functioning: Constricted/Restricted        Mood: anxious        Level of consciousness:  Alert and Oriented x4        Response to Learning: Able to verbalize current knowledge/experience and Able to verbalize/acknowledge new learning        Endings: None Reported     Modes of Intervention: Education, Socialization and Activity        Discipline Responsible: Registered Nurse        Signature:  Pedro Luis Montgomery RN

## 2018-11-02 VITALS
OXYGEN SATURATION: 99 % | SYSTOLIC BLOOD PRESSURE: 114 MMHG | RESPIRATION RATE: 16 BRPM | HEART RATE: 113 BPM | TEMPERATURE: 98.1 F | BODY MASS INDEX: 19.64 KG/M2 | HEIGHT: 72 IN | DIASTOLIC BLOOD PRESSURE: 70 MMHG | WEIGHT: 145 LBS

## 2018-11-02 LAB
BASOPHILS ABSOLUTE: 0.1 K/UL (ref 0–0.2)
BASOPHILS RELATIVE PERCENT: 0.8 %
EOSINOPHILS ABSOLUTE: 0.7 K/UL (ref 0–0.6)
EOSINOPHILS RELATIVE PERCENT: 6.2 %
HCT VFR BLD CALC: 48.9 % (ref 40.5–52.5)
HEMOGLOBIN: 16.9 G/DL (ref 13.5–17.5)
LYMPHOCYTES ABSOLUTE: 2.3 K/UL (ref 1–5.1)
LYMPHOCYTES RELATIVE PERCENT: 21.6 %
MCH RBC QN AUTO: 30.6 PG (ref 26–34)
MCHC RBC AUTO-ENTMCNC: 34.7 G/DL (ref 31–36)
MCV RBC AUTO: 88.2 FL (ref 80–100)
MONOCYTES ABSOLUTE: 1.3 K/UL (ref 0–1.3)
MONOCYTES RELATIVE PERCENT: 11.8 %
NEUTROPHILS ABSOLUTE: 6.4 K/UL (ref 1.7–7.7)
NEUTROPHILS RELATIVE PERCENT: 59.6 %
PDW BLD-RTO: 13.7 % (ref 12.4–15.4)
PLATELET # BLD: 257 K/UL (ref 135–450)
PMV BLD AUTO: 7.3 FL (ref 5–10.5)
RBC # BLD: 5.54 M/UL (ref 4.2–5.9)
WBC # BLD: 10.7 K/UL (ref 4–11)

## 2018-11-02 PROCEDURE — 36415 COLL VENOUS BLD VENIPUNCTURE: CPT

## 2018-11-02 PROCEDURE — 6370000000 HC RX 637 (ALT 250 FOR IP): Performed by: PSYCHIATRY & NEUROLOGY

## 2018-11-02 PROCEDURE — 5130000000 HC BRIDGE APPOINTMENT

## 2018-11-02 PROCEDURE — 85025 COMPLETE CBC W/AUTO DIFF WBC: CPT

## 2018-11-02 PROCEDURE — 99239 HOSP IP/OBS DSCHRG MGMT >30: CPT | Performed by: PSYCHIATRY & NEUROLOGY

## 2018-11-02 RX ORDER — MIRTAZAPINE 15 MG/1
15 TABLET, FILM COATED ORAL NIGHTLY
Qty: 30 TABLET | Refills: 0 | Status: ON HOLD | OUTPATIENT
Start: 2018-11-02 | End: 2019-05-21 | Stop reason: HOSPADM

## 2018-11-02 RX ORDER — BUSPIRONE HYDROCHLORIDE 10 MG/1
10 TABLET ORAL 3 TIMES DAILY
Qty: 90 TABLET | Refills: 0 | Status: ON HOLD | OUTPATIENT
Start: 2018-11-02 | End: 2019-05-21 | Stop reason: HOSPADM

## 2018-11-02 RX ADMIN — BUSPIRONE HYDROCHLORIDE 10 MG: 10 TABLET ORAL at 08:46

## 2018-11-02 NOTE — PLAN OF CARE
Problem: Altered Mood, Psychotic Behavior:  Goal: Able to verbalize reality based thinking  Able to verbalize reality based thinking   Outcome: Ongoing                                                                      Group Therapy Note    Date: 11/2/2018  Start Time: 10:00am  End Time: 10:50am  Number of Participants: 7    Type of Group: Psychoeducation    Recreation Therapy     Patient's Goal: To discuss positive self esteem and engage in self esteem Riley Greene. Notes: Pt attended group session. Pt listened and observed. Pt left group session early. Status After Intervention:  Improved    Participation Level:  Active Listener and Interactive    Participation Quality: Appropriate, Attentive, Sharing and Supportive      Speech:  normal      Thought Process/Content: Logical      Affective Functioning: Congruent      Mood: depressed      Level of consciousness:  Alert and Oriented x4      Response to Learning: Able to retain information and Capable of insight      Endings: None Reported    Modes of Intervention: Education, Support, Socialization and Activity      Discipline Responsible: Psychoeducational Specialist      Signature:  Jennifer Dye

## 2018-11-02 NOTE — PROGRESS NOTES
Chief Complaint:    Patients chart was reviewed and collaborated with staff as well around discharge planning. Reviewed with the patient. At this time patient is not probateable or holdable and is not suicidal/homicidal. Spent 35 minutes on discharge, and more then 50 % of that time was spent with counseling patient on discharge goals.     Dr. Dorman Render to complete discharge summary

## 2018-11-17 NOTE — DISCHARGE SUMMARY
[address]                 [address] [address]                               DISCHARGE SUMMARY    PATIENT NAME: Elle Suárez                   :        1995  MED REC NO:   [id number]                          ROOM:       2320  ACCOUNT NO:   [de-identified]                           ADMIT DATE: 10/23/2018  PROVIDER:     Abril Calderon. Rhea Hooks MD                  DISCHARGE DATE:  10/27/2018    DISPOSITION:  The patient will be discharged home. Followup in  outpatient through [facility] in [facility]. DISCHARGE MEDICATIONS:  [place] 234 mg IM, next on 2018. CONDITION AT DISCHARGE:  Stable. DISCHARGE DIAGNOSES:  AXIS I:  Bipolar affective disorder. AXIS II  Personality disorder. AXIS III:  Unremarkable. AXIS IV:  Moderate. AXIS V:  50. MENTAL STATUS EXAMINATION:  The patient is alert, oriented to person,  place and time. No threats to harm self or others. No psychotic  symptoms. Insight and judgment, improved. Mood was okay. Affect was  somewhat restricted. RECENT HOSPITALIZATION/HOSPITAL COURSE/CHIEF COMPLAINT:  A 29-year-old   white male presented with hallucinations and suicidal ideations. The  patient is a 29-year-old who has a history of chronic psychiatric  illness. He presented after a conflict with his girlfriend. He is  aphasic and defends to hallucinations telling him to hurt himself. He  is feeling suicidal and that he was running out of options. He also  hears his dad voice at times and wants to jump out of the window. He  has a history of psychotic symptoms. He was in [facility] in . He has  been to other hospitals as a teenager. HOSPITAL COURSE:  He began a trial of Invega Sustenna 234 mg IM followed  by 156 IM in 4 days, trazodone was added for sleep purposes. Continue  to monitor for any self harm or threats.   The patient had a relatively  brief stay in the hospital, and during that time, he showed

## 2019-04-01 ENCOUNTER — HOSPITAL ENCOUNTER (EMERGENCY)
Age: 24
Discharge: HOME | End: 2019-04-02
Attending: EMERGENCY MEDICINE
Payer: MEDICARE

## 2019-04-01 DIAGNOSIS — R04.0 EPISTAXIS: ICD-10-CM

## 2019-04-01 DIAGNOSIS — R44.1 VISUAL HALLUCINATIONS: Primary | ICD-10-CM

## 2019-04-01 PROCEDURE — 99284 EMERGENCY DEPT VISIT MOD MDM: CPT

## 2019-04-01 RX ORDER — LORAZEPAM 1 MG/1
0.5 TABLET ORAL ONCE
Status: COMPLETED | OUTPATIENT
Start: 2019-04-02 | End: 2019-04-02

## 2019-04-01 ASSESSMENT — PAIN DESCRIPTION - ORIENTATION: ORIENTATION: LEFT

## 2019-04-01 ASSESSMENT — PAIN DESCRIPTION - LOCATION: LOCATION: ABDOMEN

## 2019-04-01 ASSESSMENT — PAIN SCALES - GENERAL: PAINLEVEL_OUTOF10: 6

## 2019-04-02 ENCOUNTER — APPOINTMENT (OUTPATIENT)
Dept: CT IMAGING | Age: 24
End: 2019-04-02
Payer: MEDICARE

## 2019-04-02 VITALS
BODY MASS INDEX: 20.32 KG/M2 | OXYGEN SATURATION: 99 % | DIASTOLIC BLOOD PRESSURE: 72 MMHG | HEART RATE: 63 BPM | HEIGHT: 72 IN | RESPIRATION RATE: 23 BRPM | TEMPERATURE: 98.3 F | WEIGHT: 150 LBS | SYSTOLIC BLOOD PRESSURE: 103 MMHG

## 2019-04-02 LAB
A/G RATIO: 2 (ref 1.1–2.2)
ACETAMINOPHEN LEVEL: <5 UG/ML (ref 10–30)
ALBUMIN SERPL-MCNC: 4.9 G/DL (ref 3.4–5)
ALP BLD-CCNC: 91 U/L (ref 40–129)
ALT SERPL-CCNC: 10 U/L (ref 10–40)
AMPHETAMINE SCREEN, URINE: NORMAL
ANION GAP SERPL CALCULATED.3IONS-SCNC: 10 MMOL/L (ref 3–16)
AST SERPL-CCNC: 17 U/L (ref 15–37)
BARBITURATE SCREEN URINE: NORMAL
BASOPHILS ABSOLUTE: 0.2 K/UL (ref 0–0.2)
BASOPHILS RELATIVE PERCENT: 1.2 %
BENZODIAZEPINE SCREEN, URINE: NORMAL
BILIRUB SERPL-MCNC: <0.2 MG/DL (ref 0–1)
BILIRUBIN URINE: NEGATIVE
BLOOD, URINE: NEGATIVE
BUN BLDV-MCNC: 12 MG/DL (ref 7–20)
CALCIUM SERPL-MCNC: 9.9 MG/DL (ref 8.3–10.6)
CANNABINOID SCREEN URINE: NORMAL
CHLORIDE BLD-SCNC: 102 MMOL/L (ref 99–110)
CLARITY: CLEAR
CO2: 28 MMOL/L (ref 21–32)
COCAINE METABOLITE SCREEN URINE: NORMAL
COLOR: YELLOW
CREAT SERPL-MCNC: 0.8 MG/DL (ref 0.9–1.3)
EKG ATRIAL RATE: 76 BPM
EKG DIAGNOSIS: NORMAL
EKG P AXIS: 71 DEGREES
EKG P-R INTERVAL: 154 MS
EKG Q-T INTERVAL: 356 MS
EKG QRS DURATION: 88 MS
EKG QTC CALCULATION (BAZETT): 400 MS
EKG R AXIS: 70 DEGREES
EKG T AXIS: 47 DEGREES
EKG VENTRICULAR RATE: 76 BPM
EOSINOPHILS ABSOLUTE: 0.4 K/UL (ref 0–0.6)
EOSINOPHILS RELATIVE PERCENT: 3.1 %
ETHANOL: NORMAL MG/DL (ref 0–0.08)
GFR AFRICAN AMERICAN: >60
GFR NON-AFRICAN AMERICAN: >60
GLOBULIN: 2.5 G/DL
GLUCOSE BLD-MCNC: 83 MG/DL (ref 70–99)
GLUCOSE URINE: NEGATIVE MG/DL
HCT VFR BLD CALC: 44.5 % (ref 40.5–52.5)
HEMOGLOBIN: 15 G/DL (ref 13.5–17.5)
KETONES, URINE: NEGATIVE MG/DL
LEUKOCYTE ESTERASE, URINE: NEGATIVE
LIPASE: 21 U/L (ref 13–60)
LYMPHOCYTES ABSOLUTE: 3.3 K/UL (ref 1–5.1)
LYMPHOCYTES RELATIVE PERCENT: 23.4 %
Lab: NORMAL
MCH RBC QN AUTO: 29.9 PG (ref 26–34)
MCHC RBC AUTO-ENTMCNC: 33.7 G/DL (ref 31–36)
MCV RBC AUTO: 88.6 FL (ref 80–100)
METHADONE SCREEN, URINE: NORMAL
MICROSCOPIC EXAMINATION: NORMAL
MONOCYTES ABSOLUTE: 0.9 K/UL (ref 0–1.3)
MONOCYTES RELATIVE PERCENT: 6.5 %
NEUTROPHILS ABSOLUTE: 9.4 K/UL (ref 1.7–7.7)
NEUTROPHILS RELATIVE PERCENT: 65.8 %
NITRITE, URINE: NEGATIVE
OPIATE SCREEN URINE: NORMAL
OXYCODONE URINE: NORMAL
PDW BLD-RTO: 14.1 % (ref 12.4–15.4)
PH UA: 6.5
PH UA: 6.5 (ref 5–8)
PHENCYCLIDINE SCREEN URINE: NORMAL
PLATELET # BLD: 227 K/UL (ref 135–450)
PMV BLD AUTO: 7.4 FL (ref 5–10.5)
POTASSIUM SERPL-SCNC: 4 MMOL/L (ref 3.5–5.1)
PROPOXYPHENE SCREEN: NORMAL
PROTEIN UA: NEGATIVE MG/DL
RBC # BLD: 5.03 M/UL (ref 4.2–5.9)
SALICYLATE, SERUM: <0.3 MG/DL (ref 15–30)
SODIUM BLD-SCNC: 140 MMOL/L (ref 136–145)
SPECIFIC GRAVITY UA: <=1.005 (ref 1–1.03)
TOTAL PROTEIN: 7.4 G/DL (ref 6.4–8.2)
URINE REFLEX TO CULTURE: NORMAL
URINE TYPE: NORMAL
UROBILINOGEN, URINE: 0.2 E.U./DL
WBC # BLD: 14.3 K/UL (ref 4–11)

## 2019-04-02 PROCEDURE — 83690 ASSAY OF LIPASE: CPT

## 2019-04-02 PROCEDURE — G0480 DRUG TEST DEF 1-7 CLASSES: HCPCS

## 2019-04-02 PROCEDURE — 6360000004 HC RX CONTRAST MEDICATION: Performed by: EMERGENCY MEDICINE

## 2019-04-02 PROCEDURE — 6370000000 HC RX 637 (ALT 250 FOR IP): Performed by: PHYSICIAN ASSISTANT

## 2019-04-02 PROCEDURE — 93005 ELECTROCARDIOGRAM TRACING: CPT | Performed by: PHYSICIAN ASSISTANT

## 2019-04-02 PROCEDURE — 74177 CT ABD & PELVIS W/CONTRAST: CPT

## 2019-04-02 PROCEDURE — 80307 DRUG TEST PRSMV CHEM ANLYZR: CPT

## 2019-04-02 PROCEDURE — 80053 COMPREHEN METABOLIC PANEL: CPT

## 2019-04-02 PROCEDURE — 81003 URINALYSIS AUTO W/O SCOPE: CPT

## 2019-04-02 PROCEDURE — 93010 ELECTROCARDIOGRAM REPORT: CPT | Performed by: INTERNAL MEDICINE

## 2019-04-02 PROCEDURE — 85025 COMPLETE CBC W/AUTO DIFF WBC: CPT

## 2019-04-02 RX ORDER — IBUPROFEN 800 MG/1
800 TABLET ORAL ONCE
Status: DISCONTINUED | OUTPATIENT
Start: 2019-04-02 | End: 2019-04-02 | Stop reason: HOSPADM

## 2019-04-02 RX ORDER — ACETAMINOPHEN 325 MG/1
650 TABLET ORAL ONCE
Status: DISCONTINUED | OUTPATIENT
Start: 2019-04-02 | End: 2019-04-02 | Stop reason: HOSPADM

## 2019-04-02 RX ADMIN — LORAZEPAM 0.5 MG: 1 TABLET ORAL at 01:33

## 2019-04-02 RX ADMIN — IOPAMIDOL 75 ML: 755 INJECTION, SOLUTION INTRAVENOUS at 01:08

## 2019-04-02 ASSESSMENT — ENCOUNTER SYMPTOMS
RHINORRHEA: 0
NAUSEA: 0
ABDOMINAL PAIN: 0
DIARRHEA: 0
VOMITING: 0
SHORTNESS OF BREATH: 0
COUGH: 0

## 2019-04-02 NOTE — ED PROVIDER NOTES
nosebleeds. Negative for congestion and rhinorrhea. Respiratory: Negative for cough and shortness of breath. Cardiovascular: Negative for chest pain. Gastrointestinal: Negative for abdominal pain, diarrhea, nausea and vomiting. Genitourinary: Negative for difficulty urinating, dysuria and hematuria. Neurological: Positive for light-headedness. Psychiatric/Behavioral: Positive for hallucinations. Positives and Pertinent negatives as per HPI. Except as noted abovein the ROS, all other systems were reviewed and negative.        PAST MEDICAL HISTORY     Past Medical History:   Diagnosis Date    ADHD (attention deficit hyperactivity disorder)     Bipolar 1 disorder (HCC)     Borderline personality disorder ([facility]     Depression     Schizophrenia ([facility]          SURGICAL HISTORY     Past Surgical History:   Procedure Laterality Date    HAND SURGERY           CURRENTMEDICATIONS       Previous Medications    BUSPIRONE (BUSPAR) 10 MG TABLET    Take 1 tablet by mouth 3 times daily    MIRTAZAPINE (REMERON) 15 MG TABLET    Take 1 tablet by mouth nightly    NICOTINE (NICODERM CQ) 21 MG/24HR    Place 1 patch onto the skin daily as needed (smoking)    PALIPERIDONE PALMITATE (INVEGA SUSTENNA) 234 MG/1.5ML SUSP IM INJECTION    Inject 234 mg into the muscle every 30 days         ALLERGIES     Morphine    FAMILYHISTORY       Family History   Problem Relation Age of Onset    Sudden Death Father         suicide    Diabetes Mother     Asthma Sister           SOCIAL HISTORY       Social History     Socioeconomic History    Marital status: Single     Spouse name: n/a    Number of children: 0    Years of education: 15    Highest education level: None   Occupational History     Employer: UNEMPLOYED   Social Needs    Financial resource strain: None    Food insecurity:     Worry: None     Inability: None    Transportation needs:     Medical: None     Non-medical: None   Tobacco Use    Smoking status: Current Every Day Smoker     Packs/day: 0.25     Years: 1.00     Pack years: 0.25     Types: Cigarettes    Smokeless tobacco: Never Used   Substance and Sexual Activity    Alcohol use: No    Drug use: Yes     Types: Marijuana     Comment: often as he can.  last use 2 days ago    Sexual activity: Yes     Partners: Female   Lifestyle    Physical activity:     Days per week: None     Minutes per session: None    Stress: None   Relationships    Social connections:     Talks on phone: None     Gets together: None     Attends Restoration service: None     Active member of club or organization: None     Attends meetings of clubs or organizations: None     Relationship status: None    Intimate partner violence:     Fear of current or ex partner: None     Emotionally abused: None     Physically abused: None     Forced sexual activity: None   Other Topics Concern    None   Social History Narrative    None       SCREENINGS             PHYSICAL EXAM    (up to 7 for level 4, 8 or more for level 5)     ED Triage Vitals was   BP Temp Temp Source Pulse Resp SpO2 Height Weight   116/81 98.3 °F (36.8 °C) Oral 71 14 100 % 6' (1.829 m) 150 lb (68 kg)       Physical Exam   Constitutional: He is oriented to person, place, and time. He appears well-developed and well-nourished. HENT:   Head: Normocephalic and atraumatic. Right Ear: External ear normal.   Left Ear: External ear normal.   Nose: Nose normal.   Dried blood in bilateral anterior nares. No septal hematoma. No active bleeding   Eyes: Right eye exhibits no discharge. Left eye exhibits no discharge. Neck: Normal range of motion. Neck supple. No tracheal deviation present. Cardiovascular: Normal rate, regular rhythm and normal heart sounds. No murmur heard. Pulmonary/Chest: Effort normal and breath sounds normal. No stridor. No respiratory distress. He has no wheezes. Abdominal: Soft. Bowel sounds are normal. He exhibits no distension.  There is generalized patient  resents to the emergency department today for evaluation for a panic attack. Patient has a history of bipolar disorder. The patient tells me that he went to the gym today, and he states when he came home from the gym he noticed that his door was kicked in, and he was concerned that somebody had broken into his home. There is nobody in his home. He states that he did call the police. He states that he began to breathe really quickly, he states that he felt very lightheaded, he felt short of breath and he did have a nosebleed. Patient states that he has had these symptoms before. The patient states that in the scar on the way here he did see Nuno Ban shadows\" that were in the police car with him. He continues to have visual hallucinations denies any auditory hallucinations. He denies any chest pain at this time. No vomiting or diarrhea. No epistaxis this has resolved. On physical exam he does have dried blood in the bilateral anterior nares. There is no active bleeding. Is generalized abdominal tenderness, no peritoneal signs     He has a leukocytosis of 14.3, penicillin be from a stress reaction, we are awaiting urine, patient wanted to be evaluated by psych tonight likely as he was having visual hallucinations. CT of abdomen and pelvis is pending. This marks the end of my shift. Please see attending note for further details and disposition      FINAL IMPRESSION      1. Visual hallucinations          DISPOSITION/PLAN   DISPOSITION        PATIENT REFERREDTO:  No follow-up provider specified.     DISCHARGE MEDICATIONS:  New Prescriptions    No medications on file       DISCONTINUED MEDICATIONS:  Discontinued Medications    No medications on file              (Please note that portions ofthis note were completed with a voice recognition program.  Efforts were made to edit the dictations but occasionally words are mis-transcribed.)    Teri Boyce PA-C (electronically signed)            Marilyn Sigala Zachery Sandifer, PA-C  04/02/19 [address] Dean PA-C  04/02/19 [id number]

## 2019-04-02 NOTE — ED NOTES
Bed: 11  Expected date:   Expected time:   Means of arrival:   Comments:  claudy Guardado RN  04/01/19 [id number]

## 2019-04-02 NOTE — ED NOTES
Collateral Contact:  Name: Mansoor Barajas  Relation to Patient: Mother  Last Contact with Patient: today, daily contact either by phone or in person    Concerns:     Mansoor Barajas stated pt got into an argument earlier in the evening last night with his cousins, because they did not help carry in their grandmother's groceries. Grandmother told Mansoor Barajas the argument got so heated she was afraid it would become physical, so she intervened. Grandma and the cousins left the house leaving pt there alone. Pt called his mother a little later stating he had gone to the gym, and found lights on in the house when he returned. He stated he was scared, because he was sure he had turned the lights off before he left and thought maybe someone had gotten into the house. Mansoor Barajas stated she got a call a little while later from her daughter stating pt had called the daughter to say he had been taking to the hospital for passing out and having a nose bleed. Mansoor Barajas stated she talked to pt a short time later, and that was when she learned he had called the police to have the house checked, and while the police were there, he felt \"light headed\" and had the nose bleed. She stated pt has a hx of heavy nose bleeds to the point of having his nose cauterized to stop them. She stated she has been talking to pt about re-starting his psychiatric medications, but otherwise, she has not had any concerns about him recently. She stated pt has chronic anger issues and just does better on his medications. She stated pt has not been telling her about seeing or hearing things since his last admission on the [facility] in-pt unit. She stated pt has also not been making any comments about hurting himself or suicide since before his last admission. She stated she has no concerns about pt's safety at home at this time. She stated pt currently lives with his grandmother, and she keeps an eye on him.  Mansoor Barajas stated she feels safe with pt being discharged home tonight and has no concerns about him hurting himself or anyone else.

## 2019-04-02 NOTE — ED NOTES
Level of Care Disposition:     Patient was seen by ED provider and [facility] ER staff. The case was presented to psychiatric provider on-call, [facility]. Based on the ED evaluation and information presented to the provider by Nash Schneider, the decision was made to discharge patient with the following referrals: out-pt mental health referrals    RATIONALE FOR NON-ADMISSION:  The patient does not meet criteria for an involuntary psychiatric admission, because he is not presenting an imminent risk to self or others and has a plan to follow-up with his PCP/out-pt referrals. 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

## 2019-04-02 NOTE — ED NOTES
Patient brought to [facility] for evaluation due to making statements to ED MD that he was seeing things that others do not see. Patient's mother is present and is offering information, as well. Patient initially taken to B-3.      Gregor Huertas, RN  04/02/19 39 Jody Sher RN  04/02/19 [id number]

## 2019-04-02 NOTE — ED NOTES
Presenting Problem: A/V hallucinations. Appearance/Hygiene:  well-appearing, hospital attire, seated in bed, good grooming and good hygiene   Motor Behavior: WNL   Attitude: cooperative  Affect: WNL   Speech: normal pitch and normal volume  Mood: Patient with some hypermania. In general, patient states he has a good life and enjoys life. Enjoys working at the [employer] as a . Denies feelings of depression. Thought Processes: Thought patient has poor judgement and insight in regards to his mental health,he is able to validate his thoughts and feelings in regards to current events in his life. Denies that he is currently having A/V/H  Perceptions: Absent :  Patient currently states that he is not currently seeing and hearing things that other people do not. Patient does admit to history of A/V hallucinations and states that tonight he did see, \"something\" on the ambulance but is unsure of what he saw. Thought content: WNL   Suicidal ideation:  no specific plan to harm self :  Patient denies SI. Homicidal ideation:  none  Orientation: A&Ox3:  Able to state needs, make requests for assistance. Oriented to person, time and place. Is somewhat oriented to current situation, however, patient with mild cognitive deficits and has some difficulty with life choices. In many ways, patient is child like in demeanor. Memory: intact  Concentration: Fair    Insight/ judgement: impaired judgment, impaired insight. Psychosocial and contextual factors: Lives with grandmother in an apartment. Mother is currently here at the hospital and is supportive of patient. Patient on total disability due to mental health and MRDD dx's. 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 C-SSRS Summary (including current and past suicidal ideation, plan, intent, and attempts) : Denies SI. Psychiatric History:  Mother states that mental health issues started for patient at age 13/12. States this is when patient began hearing the voices and seeing shadows. Has had multiple hospitalizations. Patient reported diagnosis Bi-Polar, Schizophrenia    Outpatient services/ Provider: Patient has not been following up with outpatient services. Writer provided patient and his mother with information in regards to the importance of following up with mental health services. Both verbalized understanding. Previous Inpatient Admissions( including location and dates if known): Last hospitalized in [facility] earlier this year. Self-injurious/ Self-harm behavior: None    History of violence: Denies    Current Substance use: \"I smoke pot sometimes\"    Trauma identified: Denies    Access to Firearms: States he no longer has access to his gun. ASSESSMENT FOR IMMINENT FUTURE DANGER:      RISK FACTORS:    [x]  Age <25 or >49   [x]  Male gender   []  Depressed mood   []  Active suicidal ideation   []  Suicide plan   []  Suicide attempt   []  Access to lethal means   []  Prior suicide attempt   [x]  Active substance abuse:  Admits to use of marijuana. States he has been clean from Meth, Vicodin, Percocets, Adderall, and heroin for approximately 15 months. [x]  Highly impulsive behaviors   []  Not attending to self-care/ADLs    []  Recent significant loss   []  Chronic pain or medical illness   []  Social isolation   []  History of violence   []  Active psychosis   [x]  Cognitive impairment :  Per mother, patient with dx mild retardation. [x]  No outpatient services in place:  Has not been following up with outpatient services.    [x]  Medication noncompliance   []  No collateral information to support safety    PROTECTIVE FACTORS:  [] Age >25 and <55  [] Female gender   [x] Denies depression  [x] Denies suicidal ideation  [x] Does not have lethal plan   [x] Does not have access to guns or weapons  [x] Patient is verbally triny for safety  [] No prior suicide attempts  [] No active substance abuse  [x] Patient has social or family support  [] No active psychosis or cognitive dysfunction  [x] Physically healthy  [] Has outpatient services in place  [] Compliant with recommended medications  [x] Collateral information from mother, Flora Long, supports patient safety   [x] Patient is future oriented with plans to go to the gym. States he plans to make appointment to go to outpatient treatment. Clinical Summary:    Patient presents to [facility] voluntarily after being brought to the ED per EMS due to anxiety attack. .  Patient was clinically sober at the time of the evaluation. Patient was evaluated and offered supportive counseling. Collateral information was gathered by NICHOL from patient's mother, Flora Long.                   Susannah Beauchamp RN  04/02/19 0017

## 2019-05-17 ENCOUNTER — HOSPITAL ENCOUNTER (INPATIENT)
Age: 24
LOS: 4 days | Discharge: HOME | DRG: 885 | End: 2019-05-21
Attending: EMERGENCY MEDICINE | Admitting: PSYCHIATRY & NEUROLOGY
Payer: MEDICARE

## 2019-05-17 DIAGNOSIS — R45.851 DEPRESSION WITH SUICIDAL IDEATION: Primary | ICD-10-CM

## 2019-05-17 DIAGNOSIS — F32.A DEPRESSION WITH SUICIDAL IDEATION: Primary | ICD-10-CM

## 2019-05-17 DIAGNOSIS — F19.10 DRUG ABUSE (HCC): ICD-10-CM

## 2019-05-17 LAB
A/G RATIO: 1.7 (ref 1.1–2.2)
ACETAMINOPHEN LEVEL: <5 UG/ML (ref 10–30)
ALBUMIN SERPL-MCNC: 5.2 G/DL (ref 3.4–5)
ALP BLD-CCNC: 99 U/L (ref 40–129)
ALT SERPL-CCNC: 12 U/L (ref 10–40)
AMPHETAMINE SCREEN, URINE: POSITIVE
ANION GAP SERPL CALCULATED.3IONS-SCNC: 11 MMOL/L (ref 3–16)
AST SERPL-CCNC: 19 U/L (ref 15–37)
BARBITURATE SCREEN URINE: ABNORMAL
BASOPHILS ABSOLUTE: 0.1 K/UL (ref 0–0.2)
BASOPHILS RELATIVE PERCENT: 1 %
BENZODIAZEPINE SCREEN, URINE: ABNORMAL
BILIRUB SERPL-MCNC: 0.8 MG/DL (ref 0–1)
BUN BLDV-MCNC: 15 MG/DL (ref 7–20)
CALCIUM SERPL-MCNC: 10.6 MG/DL (ref 8.3–10.6)
CANNABINOID SCREEN URINE: POSITIVE
CHLORIDE BLD-SCNC: 99 MMOL/L (ref 99–110)
CO2: 30 MMOL/L (ref 21–32)
COCAINE METABOLITE SCREEN URINE: ABNORMAL
CREAT SERPL-MCNC: 0.9 MG/DL (ref 0.9–1.3)
EKG ATRIAL RATE: 58 BPM
EKG DIAGNOSIS: NORMAL
EKG P AXIS: 68 DEGREES
EKG P-R INTERVAL: 166 MS
EKG Q-T INTERVAL: 414 MS
EKG QRS DURATION: 94 MS
EKG QTC CALCULATION (BAZETT): 406 MS
EKG R AXIS: 85 DEGREES
EKG T AXIS: 69 DEGREES
EKG VENTRICULAR RATE: 58 BPM
EOSINOPHILS ABSOLUTE: 0.3 K/UL (ref 0–0.6)
EOSINOPHILS RELATIVE PERCENT: 3.7 %
ETHANOL: NORMAL MG/DL (ref 0–0.08)
GFR AFRICAN AMERICAN: >60
GFR NON-AFRICAN AMERICAN: >60
GLOBULIN: 3.1 G/DL
GLUCOSE BLD-MCNC: 118 MG/DL (ref 70–99)
HCT VFR BLD CALC: 50.1 % (ref 40.5–52.5)
HEMOGLOBIN: 17 G/DL (ref 13.5–17.5)
LYMPHOCYTES ABSOLUTE: 3.1 K/UL (ref 1–5.1)
LYMPHOCYTES RELATIVE PERCENT: 36.9 %
Lab: ABNORMAL
MCH RBC QN AUTO: 30.1 PG (ref 26–34)
MCHC RBC AUTO-ENTMCNC: 34 G/DL (ref 31–36)
MCV RBC AUTO: 88.5 FL (ref 80–100)
METHADONE SCREEN, URINE: ABNORMAL
MONOCYTES ABSOLUTE: 0.9 K/UL (ref 0–1.3)
MONOCYTES RELATIVE PERCENT: 11.1 %
NEUTROPHILS ABSOLUTE: 4 K/UL (ref 1.7–7.7)
NEUTROPHILS RELATIVE PERCENT: 47.3 %
OPIATE SCREEN URINE: ABNORMAL
OXYCODONE URINE: ABNORMAL
PDW BLD-RTO: 14.1 % (ref 12.4–15.4)
PH UA: 5
PHENCYCLIDINE SCREEN URINE: ABNORMAL
PLATELET # BLD: 301 K/UL (ref 135–450)
PMV BLD AUTO: 7.2 FL (ref 5–10.5)
POTASSIUM REFLEX MAGNESIUM: 5 MMOL/L (ref 3.5–5.1)
PROPOXYPHENE SCREEN: ABNORMAL
RBC # BLD: 5.66 M/UL (ref 4.2–5.9)
SALICYLATE, SERUM: 0.4 MG/DL (ref 15–30)
SODIUM BLD-SCNC: 140 MMOL/L (ref 136–145)
TOTAL PROTEIN: 8.3 G/DL (ref 6.4–8.2)
WBC # BLD: 8.4 K/UL (ref 4–11)

## 2019-05-17 PROCEDURE — 93005 ELECTROCARDIOGRAM TRACING: CPT | Performed by: NURSE PRACTITIONER

## 2019-05-17 PROCEDURE — G0480 DRUG TEST DEF 1-7 CLASSES: HCPCS

## 2019-05-17 PROCEDURE — 6370000000 HC RX 637 (ALT 250 FOR IP): Performed by: NURSE PRACTITIONER

## 2019-05-17 PROCEDURE — 80307 DRUG TEST PRSMV CHEM ANLYZR: CPT

## 2019-05-17 PROCEDURE — 99223 1ST HOSP IP/OBS HIGH 75: CPT | Performed by: NURSE PRACTITIONER

## 2019-05-17 PROCEDURE — 99285 EMERGENCY DEPT VISIT HI MDM: CPT

## 2019-05-17 PROCEDURE — 85025 COMPLETE CBC W/AUTO DIFF WBC: CPT

## 2019-05-17 PROCEDURE — 80053 COMPREHEN METABOLIC PANEL: CPT

## 2019-05-17 PROCEDURE — 1240000000 HC EMOTIONAL WELLNESS R&B

## 2019-05-17 RX ORDER — NICOTINE 21 MG/24HR
1 PATCH, TRANSDERMAL 24 HOURS TRANSDERMAL DAILY
Status: DISCONTINUED | OUTPATIENT
Start: 2019-05-18 | End: 2019-05-21 | Stop reason: HOSPADM

## 2019-05-17 RX ORDER — BENZTROPINE MESYLATE 1 MG/ML
2 INJECTION INTRAMUSCULAR; INTRAVENOUS 2 TIMES DAILY PRN
Status: DISCONTINUED | OUTPATIENT
Start: 2019-05-17 | End: 2019-05-21 | Stop reason: HOSPADM

## 2019-05-17 RX ORDER — OLANZAPINE 5 MG/1
5 TABLET ORAL ONCE
Status: COMPLETED | OUTPATIENT
Start: 2019-05-17 | End: 2019-05-17

## 2019-05-17 RX ORDER — HYDROXYZINE PAMOATE 50 MG/1
50 CAPSULE ORAL 3 TIMES DAILY PRN
Status: DISCONTINUED | OUTPATIENT
Start: 2019-05-17 | End: 2019-05-21 | Stop reason: HOSPADM

## 2019-05-17 RX ORDER — OLANZAPINE 5 MG/1
5 TABLET ORAL
Status: ACTIVE | OUTPATIENT
Start: 2019-05-17 | End: 2019-05-17

## 2019-05-17 RX ORDER — MAGNESIUM HYDROXIDE/ALUMINUM HYDROXICE/SIMETHICONE 120; 1200; 1200 MG/30ML; MG/30ML; MG/30ML
30 SUSPENSION ORAL EVERY 6 HOURS PRN
Status: DISCONTINUED | OUTPATIENT
Start: 2019-05-17 | End: 2019-05-21 | Stop reason: HOSPADM

## 2019-05-17 RX ORDER — TRAZODONE HYDROCHLORIDE 50 MG/1
50 TABLET ORAL NIGHTLY PRN
Status: DISCONTINUED | OUTPATIENT
Start: 2019-05-17 | End: 2019-05-21 | Stop reason: HOSPADM

## 2019-05-17 RX ORDER — ZIPRASIDONE MESYLATE 20 MG/ML
20 INJECTION, POWDER, LYOPHILIZED, FOR SOLUTION INTRAMUSCULAR
Status: ACTIVE | OUTPATIENT
Start: 2019-05-17 | End: 2019-05-17

## 2019-05-17 RX ORDER — NICOTINE 21 MG/24HR
1 PATCH, TRANSDERMAL 24 HOURS TRANSDERMAL ONCE
Status: COMPLETED | OUTPATIENT
Start: 2019-05-17 | End: 2019-05-18

## 2019-05-17 RX ORDER — ACETAMINOPHEN 325 MG/1
650 TABLET ORAL EVERY 4 HOURS PRN
Status: DISCONTINUED | OUTPATIENT
Start: 2019-05-17 | End: 2019-05-21 | Stop reason: HOSPADM

## 2019-05-17 RX ADMIN — OLANZAPINE 5 MG: 5 TABLET, FILM COATED ORAL at 12:57

## 2019-05-17 ASSESSMENT — SLEEP AND FATIGUE QUESTIONNAIRES
DO YOU HAVE DIFFICULTY SLEEPING: NO
DO YOU USE A SLEEP AID: NO
AVERAGE NUMBER OF SLEEP HOURS: 7

## 2019-05-17 ASSESSMENT — PAIN SCALES - GENERAL: PAINLEVEL_OUTOF10: 0

## 2019-05-17 ASSESSMENT — LIFESTYLE VARIABLES: HISTORY_ALCOHOL_USE: YES

## 2019-05-17 ASSESSMENT — PATIENT HEALTH QUESTIONNAIRE - PHQ9: SUM OF ALL RESPONSES TO PHQ QUESTIONS 1-9: 9

## 2019-05-17 NOTE — BH NOTE
Endorses 20 suicide attempts in his lifetime, recent meth use    Psychosocial and Contextual Factors:  Relationship Status: Single  Children: None   Housing: Lives with grandmother   Income: SSDI  Legal: Denies     Past Psychiatric History:  Past Diagnosis: Bipolar with psychosis, Borderline Personality Disorder, MDD, schizophrenia, ADHD, anger issues   Past Suicide Attempts: Reports 20 attempts in his lifetime, most recent February 2018, \"I put the barrel of a shotgun in my mouth\"  Past Violence: Denies   Previous Admits: [facility] (2014 x2, 2018 x2), [employer] as an adolescent, \"someplace in [place]"  Outpatient Services: Was referred to [facility] post discharge in 2018, unable to go due to transportation issues   Past Medication Trials: Buspar, Remeron, Trazodone, Straterra, Clonidine, Risperdal, Methylphenidate, Leveda Patches   Family Hx Psychiatric: Per chart review, father committed suicide    Substance Abuse History:  ETOH: Denies recent use, history of heavy alcohol, reports sobriety for the past 2 years  Illicit: Reports recent meth use, occasional MJ, history of heroin and cocaine use  Prescription: History of abusing Percocet in the past  OARRS: Ran and reviewed, no fills since 2017    Review of Systems   Constitutional: Positive for activity change, appetite change and unexpected weight change. Psychiatric/Behavioral: Positive for agitation, decreased concentration, dysphoric mood and sleep disturbance. The patient is nervous/anxious. Reports hearing father's voice at times, intermittent suicidal ideations (no specific plan)   All other systems reviewed and are negative.      Appearance/Hygiene: street clothes and seated in bed   Motor Behavior: WNL   Attitude: cooperative  Affect: intense affect   Speech: normal pitch and normal volume  Mood: anxious   Thought Processes: logical  Perceptions: reports hearing his father's voice at times, does not appear to be responding to internal stimuli Thought content: reports mild paranoia but does not appear to be overtly paranoid    Suicidal ideation: general plan to harm self, \"grab whatever is closest\"  Homicidal ideation: denies, fearful he will hurt family or friends  Cognition: WNL  Orientation: A&Ox4  Memory: intact  Concentration: fair    Insight/ judgement: impaired judgment    Exam  Physical Exam   Constitutional: He is oriented to person, place, and time. He appears well-developed. He is cooperative. HENT:   Head: Normocephalic. Nose: Nose normal.   Eyes: Lids are normal. Right eye exhibits no discharge. Left eye exhibits no discharge. Cardiovascular:   BP: 118/87, pulse: 103   Pulmonary/Chest: Effort normal. No respiratory distress. Musculoskeletal: Normal range of motion. Neurological: He is alert and oriented to person, place, and time. Skin: Skin is intact. No rash noted. Psychiatric: His speech is normal and behavior is normal. His mood appears anxious. Thought content is paranoid. Cognition and memory are normal. He expresses impulsivity and inappropriate judgment. He expresses no homicidal and no suicidal ideation. Intense affect, denies SI while in hospital but unable to contract for safety if he were discharged, reports occasionally hearing father's voice but does not appear to be RTIS upon assessment    Nursing note and vitals reviewed.      Labs  Recent Results (from the past 24 hour(s))   CBC Auto Differential    Collection Time: 05/17/19 11:45 AM   Result Value Ref Range    WBC 8.4 4.0 - 11.0 K/uL    RBC 5.66 4.20 - 5.90 M/uL    Hemoglobin 17.0 13.5 - 17.5 g/dL    Hematocrit 50.1 40.5 - 52.5 %    MCV 88.5 80.0 - 100.0 fL    MCH 30.1 26.0 - 34.0 pg    MCHC 34.0 31.0 - 36.0 g/dL    RDW 14.1 12.4 - 15.4 %    Platelets 236 108 - 331 K/uL    MPV 7.2 5.0 - 10.5 fL    Neutrophils % 47.3 %    Lymphocytes % 36.9 %    Monocytes % 11.1 %    Eosinophils % 3.7 %    Basophils % 1.0 %    Neutrophils # 4.0 1.7 - 7.7 K/uL    Lymphocytes # 3.1 1.0 - 5.1 K/uL    Monocytes # 0.9 0.0 - 1.3 K/uL    Eosinophils # 0.3 0.0 - 0.6 K/uL    Basophils # 0.1 0.0 - 0.2 K/uL   Comprehensive Metabolic Panel w/ Reflex to MG    Collection Time: 05/17/19 11:45 AM   Result Value Ref Range    Sodium 140 136 - 145 mmol/L    Potassium reflex Magnesium 5.0 3.5 - 5.1 mmol/L    Chloride 99 99 - 110 mmol/L    CO2 30 21 - 32 mmol/L    Anion Gap 11 3 - 16    Glucose 118 (H) 70 - 99 mg/dL    BUN 15 7 - 20 mg/dL    CREATININE 0.9 0.9 - 1.3 mg/dL    GFR Non-African American >60 >60    GFR African American >60 >60    Calcium 10.6 8.3 - 10.6 mg/dL    Total Protein 8.3 (H) 6.4 - 8.2 g/dL    Alb 5.2 (H) 3.4 - 5.0 g/dL    Albumin/Globulin Ratio 1.7 1.1 - 2.2    Total Bilirubin 0.8 0.0 - 1.0 mg/dL    Alkaline Phosphatase 99 40 - 129 U/L    ALT 12 10 - 40 U/L    AST 19 15 - 37 U/L    Globulin 3.1 g/dL   Acetaminophen level    Collection Time: 05/17/19 11:45 AM   Result Value Ref Range    Acetaminophen Level <5 (L) 10 - 30 ug/mL   Salicylate    Collection Time: 05/17/19 11:45 AM   Result Value Ref Range    Salicylate, Serum 0.4 (L) 15.0 - 30.0 mg/dL   Ethanol    Collection Time: 05/17/19 11:45 AM   Result Value Ref Range    Ethanol Lvl None Detected mg/dL      Radiology  None completed while in ED    EKG Interpretation  None completed while in ED    Diagnosis:  1. Psychosis, unspecified  2. Stimulant Use Disorder, methamphetamines  3.  Borderline Personality Disorder, per history    Level of Care Disposition: Admit to adult [facility]    RATIONALE FOR ADMISSION:   Patient has a mental illness: psychosis unspecified, stimulant use disorder, BPD  Patient at risk of danger to self as demonstrated by expressing suicidal ideation with no specific plan (\"I'll grab whatever is closest\"), unable to contract for safety should he be discharged home, fearful he will hurt others AND they could benefit from psychiatric treatment    Clinical Summary:    Patient presents to [facility] via police on a SOB for paranoia, increased anger, Celia Ford does not appear to be medically compromised and was evaluated and treated in the Emergency Department. Patient was clinically sober at the time of the evaluation. Patient was evaluated and offered supportive counseling. Collateral information was gathered by [facility] from patient's mother, Sudhir Adhikari. Patient was given Zyprexa 5 mg while in [place] for paranoia this was effective in reducing his symptoms. At this time the patient denies suicidal ideations while in the hospital but is unable to contract for safety should he leave. He also verbalizes some concern that he will harm others unintentionally if he were to go home. He is asking to be admitted to the hospital and will be signed in voluntarily at this time. The patient will be admitted to the inpatient unit.          Valente Benitez, MPH, APRN, PMHNP-BC  05/17/19

## 2019-05-17 NOTE — BH NOTE
Patient reports he has been off all psychiatric medications since his last admission to [facility] in October 2018.

## 2019-05-17 NOTE — ED NOTES
Carlos Enrique Sanchez,  from [facility] has met with pt in the ED and would like to be apart of the discharge planning. Her number is .      [address]  05/17/19 2921

## 2019-05-17 NOTE — BH NOTE
Collateral Contact:    Name: Sukh Mccormick  Relation to Patient: Mother  Last Contact with Patient: Today, 05/17/19  Concerns: Sukh Mccormick reports concerns with Lum Dieter returning home, \"he's been telling me a lot is going on inside his head and he doesn't know how to explain it. He's scared because of this\". She states that he lives with her mother (patient's grandmother) and that he has recently verbalized that he does not trust himself around his family and friends. She states that he has never laid hands on anyone before but she is unsure if \"he would get to that point and black out and do it\". She does report a long history of anger issues. She also reports that he has verbalized to her thoughts of not wanting to live anymore. When asked if she has concerns on whether or not he would hurt himself, she replied \"I don't know\". She is not aware of any past suicide attempts but reports he has a history of cutting and hurting himself to feel better and to calm down. Sukh Mccormick is supportive of plan to admit [facility] and states that [facility] Hawkins will be better off where he's at. He'll be safe there\".

## 2019-05-18 PROBLEM — F19.10 POLYSUBSTANCE ABUSE (HCC): Status: ACTIVE | Noted: 2019-05-18

## 2019-05-18 PROCEDURE — 1240000000 HC EMOTIONAL WELLNESS R&B

## 2019-05-18 PROCEDURE — 99223 1ST HOSP IP/OBS HIGH 75: CPT | Performed by: PSYCHIATRY & NEUROLOGY

## 2019-05-18 PROCEDURE — 99221 1ST HOSP IP/OBS SF/LOW 40: CPT | Performed by: PHYSICIAN ASSISTANT

## 2019-05-18 PROCEDURE — 6370000000 HC RX 637 (ALT 250 FOR IP): Performed by: NURSE PRACTITIONER

## 2019-05-18 PROCEDURE — 6370000000 HC RX 637 (ALT 250 FOR IP): Performed by: PSYCHIATRY & NEUROLOGY

## 2019-05-18 RX ORDER — RISPERIDONE 2 MG/1
2 TABLET, FILM COATED ORAL 2 TIMES DAILY
Status: DISCONTINUED | OUTPATIENT
Start: 2019-05-18 | End: 2019-05-20

## 2019-05-18 RX ORDER — BUSPIRONE HYDROCHLORIDE 10 MG/1
10 TABLET ORAL 3 TIMES DAILY
Status: DISCONTINUED | OUTPATIENT
Start: 2019-05-18 | End: 2019-05-20

## 2019-05-18 RX ADMIN — RISPERIDONE 2 MG: 2 TABLET ORAL at 21:03

## 2019-05-18 RX ADMIN — BUSPIRONE HYDROCHLORIDE 10 MG: 10 TABLET ORAL at 18:07

## 2019-05-18 RX ADMIN — NICOTINE POLACRILEX 4 MG: 4 GUM, CHEWING BUCCAL at 18:08

## 2019-05-18 RX ADMIN — TRAZODONE HYDROCHLORIDE 50 MG: 50 TABLET ORAL at 21:34

## 2019-05-18 RX ADMIN — BUSPIRONE HYDROCHLORIDE 10 MG: 10 TABLET ORAL at 21:03

## 2019-05-18 ASSESSMENT — SLEEP AND FATIGUE QUESTIONNAIRES
DO YOU HAVE DIFFICULTY SLEEPING: NO
DIFFICULTY FALLING ASLEEP: YES
DIFFICULTY ARISING: NO
RESTFUL SLEEP: NO
DIFFICULTY STAYING ASLEEP: NO
AVERAGE NUMBER OF SLEEP HOURS: 7
SLEEP PATTERN: DIFFICULTY FALLING ASLEEP
DO YOU USE A SLEEP AID: YES

## 2019-05-18 ASSESSMENT — PATIENT HEALTH QUESTIONNAIRE - PHQ9: SUM OF ALL RESPONSES TO PHQ QUESTIONS 1-9: 11

## 2019-05-18 ASSESSMENT — LIFESTYLE VARIABLES: HISTORY_ALCOHOL_USE: YES

## 2019-05-18 NOTE — FLOWSHEET NOTE
05/18/19 1543   Encounter Summary   Services provided to: Patient   Referral/Consult From: Nurse;Patient   Support System Family members   Continue Visiting   (5/18 Initial visit/prayer/Bible. Very receptive.)   Complexity of Encounter High   Length of Encounter 30 minutes   Spiritual/Advent   Type Spiritual support   Assessment Calm; Approachable; Hopeful;Doubtful   Intervention Active listening;Explored feelings, thoughts, concerns;Explored coping resources;Nurtured hope;Prayer;Scripture;Provided reading materials/devotional materials;Sustaining presence/ Ministry of presence;Empowerment; Confronted/challenged; Discussed meaning/purpose;Discussed relationship with God;Discussed belief system/Yarsani practices/flavio;Discussed illness/injury and it's impact   Outcome Comfort;Expressed gratitude;Expressed feelings of desmond, peace, and/or awe;Engaged in conversation; Shared life review;Expressed feelings/needs/concerns;Expressed regrets;Encouraged; Hopeful;Receptive

## 2019-05-18 NOTE — H&P
agreeable to be admitted to inpatient [facility]. On interview this afternoon patient is cooperative. He exhibits psychomotor agitation and is restless. He exhibits a wide-eyed expression and somewhat intense eye contact. He continues to endorse the above symptoms. His thought process is tangential and rambling. He describes a sense of foreboding and vague, general paranoia. He fears that something terrible will happen him or his family. Simultaneously he describes issues with feeling as though he cannot control his anger and is having outbursts of screaming and threatening his family members. Past Psychiatric History:    Past Diagnosis: Bipolar with psychosis, Borderline Personality Disorder, MDD, schizophrenia, ADHD, anger issues   Past Suicide Attempts: Reports 20 attempts in his lifetime, most recent February 2018, \"I put the barrel of a shotgun in my mouth\"  Past Violence: Denies   Previous Admits: [facility] (2014 x2, 2018 x2), [employer] as an adolescent, \"someplace in [facility]"  Outpatient Services: Was referred to [facility] post discharge in 2018, unable to go due to transportation issues   Past Medication Trials: Buspar, Remeron, Trazodone, Straterra, Clonidine, Risperdal, Methylphenidate, Jodeane Aus   Family Hx Psychiatric: Per chart review, father committed suicide    Past Medical History:  Past Medical History:   Diagnosis Date    ADHD (attention deficit hyperactivity disorder)     [facility]ipolar 1 disorder ([facility]     Borderline personality disorder ([facility]     Depression     Schizophrenia ([facility]        Home Medications:  Prior to Admission medications    Medication Sig Start Date End Date Taking?  Authorizing Provider   busPIRone (BUSPAR) 10 MG tablet Take 1 tablet by mouth 3 times daily 11/2/18   Shakila Ramsey MD   mirtazapine (REMERON) 15 MG tablet Take 1 tablet by mouth nightly 11/2/18   Shakila Ramsey MD   paliperidone palmitate (INVEGA SUSTENNA) 234 MG/1.5ML SUSP IM injection Inject 234 mg activity: None   Other Topics Concern    None   Social History Narrative    None       Current Medications Ordered:   risperiDONE  2 mg Oral BID    busPIRone  10 mg Oral TID    nicotine  1 patch Transdermal Daily      PRN Meds: acetaminophen, hydrOXYzine, traZODone, benztropine mesylate, magnesium hydroxide, aluminum & magnesium hydroxide-simethicone, nicotine polacrilex     ROS:  See Medical H&PE      PE:    /79   Pulse 86   Temp 97 °F (36.1 °C) (Oral)   Resp 18   Ht 5' 11\" (1.803 m)   Wt 155 lb (70.3 kg)   SpO2 98%   BMI 21.62 kg/m²       Motor / Gait: psychomotor agitation, normal gait and station  AIMS: 0    Mental Status Examination:    Appearance: White male, it appears stated age, fair grooming and hygiene  Behavior/Attitude toward examiner: Cooperative, restless, somewhat intense eye contact  Speech:  Rapid bordering on pressured   Mood:  \"Out of control\"  Affect:  Elevated  Thought processes:  Tangential  Thought Content: + SI, + vague HI, +paranoia  Perceptions: Endorses AH, but not actively RTIS  Attention: Distractible  Abstraction: [place]  Cognition: Average SOWMYA, Alert and oriented to person, place, time, and situation, recall Grossly intact  Insight: Limited insight   Judgment: Limited judgment      LAB:   Admission on 05/17/2019   Component Date Value Ref Range Status    WBC 05/17/2019 8.4  4.0 - 11.0 K/uL Final    RBC 05/17/2019 5.66  4.20 - 5.90 M/uL Final    Hemoglobin 05/17/2019 17.0  13.5 - 17.5 g/dL Final    Hematocrit 05/17/2019 50.1  40.5 - 52.5 % Final    MCV 05/17/2019 88.5  80.0 - 100.0 fL Final    MCH 05/17/2019 30.1  26.0 - 34.0 pg Final    MCHC 05/17/2019 34.0  31.0 - 36.0 g/dL Final    RDW 05/17/2019 14.1  12.4 - 15.4 % Final    Platelets 21/63/9799 301  135 - 450 K/uL Final    MPV 05/17/2019 7.2  5.0 - 10.5 fL Final    Neutrophils % 05/17/2019 47.3  % Final    Lymphocytes % 05/17/2019 36.9  % Final    Monocytes % 05/17/2019 11.1  % Final    Eosinophils None Detected  Conversion factor:  100 mg/dl = .100 g/dl  For Medical Purposes Only      Amphetamine Screen, Urine 05/17/2019 POSITIVE* Negative <1000ng/mL Final    Comment: High concentrations of ephedrine/pseudoephedrine or  phenylpropanolamine may cause false positive results  for amphetamine. Therefore, confirmatory testing for  amphetamine should be considered if clinically indicated.  Barbiturate Screen, Ur 05/17/2019 Neg  Negative <200 ng/mL Final    Benzodiazepine Screen, Urine 05/17/2019 Neg  Negative <200 ng/mL Final    Cannabinoid Scrn, Ur 05/17/2019 POSITIVE* Negative <50 ng/mL Final    Cocaine Metabolite Screen, Urine 05/17/2019 Neg  Negative <300 ng/mL Final    Opiate Scrn, Ur 05/17/2019 Neg  Negative <300 ng/mL Final    Comment: \"Therapeutic levels of pain medication, especially oxycontin and synthetic  opioids, may not be detected by this Methodology. Pain management screen  panel  Drug panel-PM-Hi Res Ur, Interp (PAIN) should be considered for drug  monitoring \".  PCP Screen, Urine 05/17/2019 Neg  Negative <25 ng/mL Final    Methadone Screen, Urine 05/17/2019 Neg  Negative <300 ng/mL Final    Propoxyphene Scrn, Ur 05/17/2019 Neg  Negative <300 ng/mL Final    pH, UA 05/17/2019 5.0   Final    Comment: Urine pH less than 5.0 or greater than 8.0 may indicate sample adulteration. Another sample should be collected if clinically  indicated.  Drug Screen Comment: 05/17/2019 see below   Final    Comment: This method is a screening test to detect only these drug  classes as part of a medical workup. Confirmatory testing  by another method should be ordered if clinically indicated.       Oxycodone Urine 05/17/2019 Neg  Negative <100 ng/ml Final    Ventricular Rate 05/17/2019 58  BPM Final    Atrial Rate 05/17/2019 58  BPM Final    P-R Interval 05/17/2019 166  ms Final    QRS Duration 05/17/2019 94  ms Final    Q-T Interval 05/17/2019 414  ms Final    QTc Calculation (Bazett) 05/17/2019 406  ms Final    P Axis 05/17/2019 68  degrees Final    R Axis 05/17/2019 85  degrees Final    T Axis 05/17/2019 69  degrees Final    Diagnosis 05/17/2019 Sinus bradycardiaEarly repolarizationOtherwise normal ECGWhen compared with ECG of 02-APR-2019 00:01,No significant change was foundConfirmed by Corrie Cooks MD, [address] ([facility]) on 5/17/2019 7:35:12 PM   Final           Assessment and Plan:    Diagnoses:   Primary Psychiatric (DSM V) Diagnosis: Bipolar disorder, hypomanic, with psychotic features  Secondary Psychiatric (DSM V) Diagnoses: Cluster B personality traits  Chemical Dependency Diagnoses: Amphetamine use disorder  Active Medical Diagnoses: No active or chronic medical problems      All conditions detailed above are being treated while patient is hospitalized. Tx plan: Generally: prevent self injury, stabilize affect, restore sleep, treat depression, treat anxiety, establish/maintain aftercare, increase coping mechanisms, improve medication compliance. All conditions present on admission are being treated while pt is hospitalized. Legal Status: Voluntary    1. Bipolar disorder:  -patient was treated with a combination of risperidone ultimately transitioned to long-acting injection of Invega and buspirone during his last admission. He was not able to continue these medications due to failure of outpatient follow-up after discharge.  -Resume risperidone 2 mg twice daily and buspirone 10 mg TID. 2.  Chemical Dependency Issues:  Patient is currently endorsing motivation for sobriety. He potentially would benefit from an inpatient rehabilitation program as compared to outpatient rehabilitation which has evidently failed in the past.    Function:  No acute functional needs. Medical Problems:  No acute or chronic medical problems.     Code Status: Full code    Disposition:    Disposition planning on going    Estimated length of stay: 3 to 5 days    Prognosis:  Guarded     Criteria for

## 2019-05-18 NOTE — ED PROVIDER NOTES
Emergency Department Attending Note    Melanie Valdez DO    Date of ED VIsit: 5/17/2019    CHIEF COMPLAINT  Psychiatric Evaluation (Pt called 911 because he was having SI and HI. Pt states he last used meth last night. Pt in contact with [facility] and would like inpatient treatment. )      HISTORY OF PRESENT ILLNESS  Orin Hoover is a 26 y.o. male  With Vital signs of /61   Pulse 98   Temp 98.2 °F (36.8 °C) (Oral)   Resp 16   Ht 5' 11\" (1.803 m)   Wt 155 lb (70.3 kg)   SpO2 98%   BMI 21.62 kg/m²  who presents to the ED with a complaint of suicidal ideation and report of homicidal ideation. Patient reports he woke up and was in an uncontrollable rage. He is having about cutting his throat, and a fight with his sister and was thinking about breaking her neck. He called 911 for assistance. He admits to meth use last night. He denies other current drug abuse. He did sleep last night. He reports he has a history of underlying psychiatric disorder, bipolar. He denies visual or auditory hallucinations. He additionally spoke with [facility] and would like inpatient rehab if possible for his issues with drugs. He has not been on psychiatric medications in many years. .  No other complaints, modifying factors or associated symptoms. I have reviewed the following from the nursing documentation.     Past Medical History:   Diagnosis Date    ADHD (attention deficit hyperactivity disorder)     Bipolar 1 disorder (HCC)     [facility]line [facility]     Depression     Schizophrenia ([facility]      Past Surgical History:   Procedure Laterality Date    HAND SURGERY       Family History   Problem Relation Age of Onset    Sudden Death Father         suicide    Diabetes Mother     Asthma Sister      Social History     Socioeconomic History    Marital status: Single     Spouse name: n/a    Number of children: 0    Years of education: 15    Highest education level: Not on file   Occupational History Employer: UNEMPLOYED   Social Needs    Financial resource strain: Not on file    Food insecurity:     Worry: Not on file     Inability: Not on file    Transportation needs:     Medical: Not on file     Non-medical: Not on file   Tobacco Use    Smoking status: Current Every Day Smoker     Packs/day: 0.25     Years: 1.00     Pack years: 0.25     Types: Cigarettes    Smokeless tobacco: Never Used   Substance and Sexual Activity    Alcohol use: No    Drug use: Yes     Types: Marijuana     Comment: History of use of meth, vicodin, percocet, adderall, heroin    Sexual activity: Yes     Partners: Female   Lifestyle    Physical activity:     Days per week: Not on file     Minutes per session: Not on file    Stress: Not on file   Relationships    Social connections:     Talks on phone: Not on file     Gets together: Not on file     Attends [employer]ism service: Not on file     Active member of club or organization: Not on file     Attends meetings of clubs or organizations: Not on file     Relationship status: Not on file    Intimate partner violence:     Fear of current or ex partner: Not on file     Emotionally abused: Not on file     Physically abused: Not on file     Forced sexual activity: Not on file   Other Topics Concern    Not on file   Social History Narrative    Not on file     Current Facility-Administered Medications   Medication Dose Route Frequency Provider Last Rate Last Dose    nicotine (NICODERM CQ) 14 MG/24HR 1 patch  1 patch Transdermal Once DARLEEN Mckeon - CNP   1 patch at 05/17/19 1257    acetaminophen (TYLENOL) tablet 650 mg  650 mg Oral Q4H PRN Xi De Jesus, APRN - CNP        hydrOXYzine (VISTARIL) capsule 50 mg  50 mg Oral TID PRN Xi De Jesus APRN - CNP        traZODone (DESYREL) tablet 50 mg  50 mg Oral Nightly PRN Xi De Jesus, APRN - CNP        benztropine mesylate (COGENTIN) injection 2 mg  2 mg Intramuscular BID PRN Xi De Jesus APRN - CNP        magnesium hydroxide (MILK OF MAGNESIA) 400 MG/5ML suspension 30 mL  30 mL Oral Daily PRN Xi Varela APRN - CNP        aluminum & magnesium hydroxide-simethicone (MAALOX) 200-200-20 MG/5ML suspension 30 mL  30 mL Oral Q6H PRN Xi Varela APRN - CNP        nicotine (NICODERM CQ) 14 MG/24HR 1 patch  1 patch Transdermal Daily Xi Varela APRN - CNP        nicotine polacrilex (NICORETTE) gum 4 mg  4 mg Oral PRN Devon George MD         Allergies   Allergen Reactions    Morphine Itching       REVIEW OF SYSTEMS  10 systems reviewed, pertinent positives per HPI otherwise noted to be negative     PHYSICAL EXAM  /61   Pulse 98   Temp 98.2 °F (36.8 °C) (Oral)   Resp 16   Ht 5' 11\" (1.803 m)   Wt 155 lb (70.3 kg)   SpO2 98%   BMI 21.62 kg/m²   GENERAL APPEARANCE: Awake and alert. Cooperative. In mild agitation distress. HEAD: Normocephalic. Atraumatic. EYES: PERRL. EOM's grossly intact. ENT: Mucous membranes are pink and moist.   NECK: Supple. HEART: RRR. No murmurs. LUNGS: Respirations unlabored. CTAB. Good air exchange. ABDOMEN: Soft. Non-distended. Non-tender. No masses. No organomegaly. No guarding or rebound. EXTREMITIES: No peripheral edema. Moves all extremities equally. All extremities neurovascularly intact. SKIN: Warm and dry. No acute rashes. NEUROLOGICAL: Alert and oriented. Strength 5/5, sensation intact. Gait normal.   PSYCHIATRIC: slightly anxious mood and affect. No HI expressed to me. SI and the plan to cut his throat.   Denies suicidal ideation at this time    RADIOLOGY    See below     EKG:     See below      ED COURSE/MDM    Patient evaluated by behavioral admitted for stabilization    ED Course as of May 18 0658   Fri May 17, 2019   1158 CBC Auto Differential:    WBC 8.4   RBC 5.66   Hemoglobin Quant 17.0   Hematocrit 50.1   MCV 88.5   MCH 30.1   MCHC 34.0   RDW 14.1   Platelet Count 080   MPV 7.2   Neutrophils % 47.3   Lymphocyte % 36.9   Monocytes % 11.1   Eosinophils % 3.7 Basophils % 1.0   Neutrophils # 4.0   Lymphocytes # 3.1   Monocytes # 0.9   Eosinophils # 0.3   Basophils # 0.1 [WL]   1158 Ethanol [WL]   5163 Salicylate [WL]   7319 Acetaminophen level [WL]   1158 Comprehensive Metabolic Panel w/ Reflex to MG [WL]   1158 Drug screen multi urine [WL]   9888 Pt medically clear for  eval    [WL]      ED Course User Index  [WL] Nick Hawkins DO       Old records were reviewed when applicable.  The ED course and plan were reviewed and results discussed with the pt,     CLINICAL IMPRESSION and DISPOSITION  Meri Gaytan was stable and diagnosed with depression with suicidal ideation, drug abuse    Patient was treated with  zyprexa      CRITICAL CARE TIME:   N/A                       Nick Hawkins DO  05/18/19 7624

## 2019-05-18 NOTE — PLAN OF CARE
Problem: Depressive Behavior With or Without Suicide Precautions:  Goal: Absence of self-harm  Outcome: Ongoing   Pt denies SI/HI and A/V hallucinations at this time. Pt states last methamphetamine use was 5/16. Pt speech is pressured. Is loose at times. Pt states he \"wants to go to inpatient treatment this time. \" Pt given list of treatment centers and discussed calling them. Pt agreeable.

## 2019-05-18 NOTE — H&P
Hospital Medicine History & Physical      PCP: No primary care provider on file. Date of Admission: 5/17/2019    Date of Service: Pt seen/examined on 5/18/2019     Chief Complaint:    Chief Complaint   Patient presents with    Psychiatric Evaluation     Pt called 911 because he was having SI and HI. Pt states he last used meth last night. Pt in contact with [facility] and would like inpatient treatment. History Of Present Illness: The patient is a 26 y.o. male with polysubstance abuse who presented to [facility] ED for above complaint. Patient was seen and evaluated in the ED by the ED medical provider, patient was medically cleared for admission to [facility] at [facility]. This note serves as an admission medical H&P. Tobacco use: 1/2 ppd   ETOH use: denies   Illicit drug use: UDS+ amphetamines and THC. He admits to meth, THC, heroin, opiate pill use     Patient denies any medical complaints     Past Medical History:        Diagnosis Date    ADHD (attention deficit hyperactivity disorder)     Bipolar 1 disorder (HCC)     Borderline personality disorder ([facility]     Depression     Schizophrenia ([facility]        Past Surgical History:        Procedure Laterality Date    HAND SURGERY         Medications Prior to Admission:    Prior to Admission medications    Medication Sig Start Date End Date Taking?  Authorizing Provider   busPIRone (BUSPAR) 10 MG tablet Take 1 tablet by mouth 3 times daily 11/2/18   Estefani Smith MD   mirtazapine (REMERON) 15 MG tablet Take 1 tablet by mouth nightly 11/2/18   Estefani Smith MD   paliperidone palmitate (INVEGA SUSTENNA) 234 MG/1.5ML SUSP IM injection Inject 234 mg into the muscle every 30 days 11/21/18   Estefani Smith MD   nicotine (NICODERM CQ) 21 MG/24HR Place 1 patch onto the skin daily as needed (smoking) 10/26/18   Estefani Smith MD       Allergies:  Morphine    Social History:  The patient currently lives at home with his grandma     TOBACCO:   reports that he has been smoking cigarettes. He has a 0.25 pack-year smoking history. He has never used smokeless tobacco.  ETOH:   reports that he does not drink alcohol. Family History:   Positive as follows:        Problem Relation Age of Onset    Sudden Death Father         suicide    Diabetes Mother     Asthma Sister        REVIEW OF SYSTEMS:       Constitutional: Negative for fever   HENT: Negative for sore throat   Right ear fullness   Eyes: Negative for redness   Respiratory: Negative  for dyspnea, cough   Cardiovascular: Negative for chest pain   Gastrointestinal: Negative for vomiting, diarrhea   Genitourinary: Negative for hematuria   Musculoskeletal: Negative for arthralgias   Skin: Negative for rash   Neurological: Negative for syncope    Hematological: Negative for easy bruising/bleeding   Psychiatric/Behavorial: Per psychiatry team evaluation     PHYSICAL EXAM:    /79   Pulse 86   Temp 97 °F (36.1 °C) (Oral)   Resp 18   Ht 5' 11\" (1.803 m)   Wt 155 lb (70.3 kg)   SpO2 98%   BMI 21.62 kg/m²     Gen: No distress. Alert. Eyes: PERRL. No sclera icterus. No conjunctival injection. ENT: No discharge. Pharynx clear. Bilateral ear canals and TMs are normal   Neck: No JVD. No Carotid Bruit. Trachea midline. Resp: No accessory muscle use. No crackles. No wheezes. No rhonchi. CV: Regular rate. Regular rhythm. No murmur. No rub. No edema. GI: Non-tender. Non-distended. Normal bowel sounds. Skin: Warm and dry. No nodule on exposed extremities. No rash on exposed extremities. M/S: No cyanosis. No joint deformity. No clubbing. Neuro: Awake. No focal neurologic deficit on exam.  Cranial nerves II through XII intact. Patient is able to ambulate without difficulty.   Psych: Per psychiatry team evaluation     CBC:   Recent Labs     05/17/19  1145   WBC 8.4   HGB 17.0   HCT 50.1   MCV 88.5        BMP:   Recent Labs     05/17/19  1145      K 5.0   CL 99   CO2 30   BUN 15   CREATININE 0.9 LIVER PROFILE:   Recent Labs     05/17/19  1145   AST 19   ALT 12   BILITOT 0.8   ALKPHOS 99     UA:  Recent Labs     05/17/19  1434   PHUR 5.0       U/A:    Lab Results   Component Value Date    COLORU Yellow 04/02/2019    CLARITYU Clear 04/02/2019    SPECGRAV <=1.005 04/02/2019    LEUKOCYTESUR Negative 04/02/2019    BLOODU Negative 04/02/2019    GLUCOSEU Negative 04/02/2019     ASSESSMENT/PLAN:  Psychosis  - per psychiatry team    Polysubstance abuse  - [facility] has been contacted     Tobacco Dependence  -Recommended cessation  - nicotine patch ordered     Bienvenido Canales PA-C  5/18/2019 9:53 AM

## 2019-05-18 NOTE — GROUP NOTE
Group Therapy Note    Date: May 18    Group Start Time: 1100  Group End Time: 1150  Group Topic: Psychoeducation    [id number] [facility]        Group Therapy Note    Attendees: 10         Patient's Goal:  Patient will understand the benefits of meditation and how to practice it to decrease symptoms of anxiety    Notes:  Patient was provided with educational materials about the benefits of meditation and how to practice it. Patients then engaged in a meditation exercise and discussed how it made them feel. Patient discussed how he practices some of the skills already and how he can reduce his symptoms. Status After Intervention:  Improved    Participation Level:  Active Listener and Interactive    Participation Quality: Appropriate, Attentive and Sharing      Speech:  normal      Thought Process/Content: Logical      Affective Functioning: Congruent      Mood: depressed      Level of consciousness:  Alert, Oriented x4 and Attentive      Response to Learning: Able to verbalize current knowledge/experience, Able to change behavior and Progressing to goal      Endings: None Reported    Modes of Intervention: Education      Discipline Responsible: /Counselor      Signature:  JINNY Peck

## 2019-05-18 NOTE — GROUP NOTE
Group Therapy Note    Date: May 18    Group Start Time: 0115  Group End Time: 0215  Group Topic: Cognitive Skills    [facility]        [facility] Note    Attendees: 8         Discussed Distress tolerance and mindfulness as the group topic     Notes:  Pt fully participated in group discussion and followed along with handouts. Pt was really engaged and got a lot out of group. Pt provided examples and discussed how using these coping tools can help in times of distress. Status After Intervention:  Improved    Participation Level: Active Listener and Interactive    Participation Quality: Appropriate and Attentive      Speech:  normal      Thought Process/Content: Logical      Affective Functioning: Constricted/Restricted      Mood: anxious      Level of consciousness:  Alert      Response to Learning: Able to verbalize current knowledge/experience, Able to verbalize/acknowledge new learning and Able to retain information      Endings: None Reported    Modes of Intervention: Education, Socialization, Exploration, Clarifying and Problem-solving      Discipline Responsible: /Counselor      Signature:   GLENIS Villafana
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
## 2019-05-19 PROCEDURE — 1240000000 HC EMOTIONAL WELLNESS R&B

## 2019-05-19 PROCEDURE — 6370000000 HC RX 637 (ALT 250 FOR IP): Performed by: PSYCHIATRY & NEUROLOGY

## 2019-05-19 PROCEDURE — 6370000000 HC RX 637 (ALT 250 FOR IP): Performed by: NURSE PRACTITIONER

## 2019-05-19 RX ADMIN — BUSPIRONE HYDROCHLORIDE 10 MG: 10 TABLET ORAL at 09:46

## 2019-05-19 RX ADMIN — BUSPIRONE HYDROCHLORIDE 10 MG: 10 TABLET ORAL at 14:16

## 2019-05-19 RX ADMIN — HYDROXYZINE PAMOATE 50 MG: 50 CAPSULE ORAL at 20:21

## 2019-05-19 RX ADMIN — RISPERIDONE 2 MG: 2 TABLET ORAL at 20:20

## 2019-05-19 RX ADMIN — BUSPIRONE HYDROCHLORIDE 10 MG: 10 TABLET ORAL at 20:21

## 2019-05-19 RX ADMIN — TRAZODONE HYDROCHLORIDE 50 MG: 50 TABLET ORAL at 20:30

## 2019-05-19 RX ADMIN — RISPERIDONE 2 MG: 2 TABLET ORAL at 09:47

## 2019-05-19 RX ADMIN — NICOTINE POLACRILEX 4 MG: 4 GUM, CHEWING BUCCAL at 20:30

## 2019-05-19 NOTE — PLAN OF CARE
Radha Suzannekia reports \" I am finished withdrawing and I feel so much better! \" Brightened affect. Patient reports he plans to stay away from his old friends that he had when he was using and plans to work with his  on managing cravings and staying clean after discharge. Patient is medication compliant and attending groups. Absent of self harm. Denies current SI/HI/AVH.

## 2019-05-19 NOTE — BH NOTE
Group Therapy Note    Date: 5/18/2019  Start Time: 20:00  End Time:  21:00  Number of Participants: 15    Type of Group: Recreational  Wrap up    Dexter De Dios Information  Module Name:  /  Session Number:  /    Patient's Goal:  D/c planning    Notes:  Continuing to work on goal    Status After Intervention:  Improved    Participation Level:  Active Listener and Interactive    Participation Quality: Appropriate and Attentive      Speech:  normal      Thought Process/Content: Logical      Affective Functioning: Blunted      Mood: anxious and depressed      Level of consciousness:  Alert, Oriented x4 and Attentive      Response to Learning: Progressing to goal      Endings: None Reported    Modes of Intervention: Socialization and Problem-solving      Discipline Responsible: Behavorial Health Tech      Signature:  Romeo Toledo

## 2019-05-19 NOTE — PLAN OF CARE
Problem: Anger Management/Homicidal Ideation:  Goal: Able to display appropriate communication and problem solving  Outcome: Ongoing   Pt denies SI/HI and A/V hallucinations at this time. Pt speech is slightly pressured and rapid, but improved from yesterday. Pt States he is \"definitely coming down. \" Pt interested in inpatient chemical dependency treatment. Pt states he will start calling on Monday for possible placement. Pt has been social and attending groups.

## 2019-05-19 NOTE — GROUP NOTE
Group Therapy Note    Date: May 19    Group Start Time: 0115  Group End Time: 0215  Group Topic: Cognitive Skills    [address], [place]        Group Therapy Note    Attendees: 12         Discussed conflict resolution skills, went over DEAR Man, & non -violent communication process    Notes: Pt was engaged in group discussion and followed along with the handouts given. Status After Intervention:  Improved    Participation Level: Active Listener and Interactive    Participation Quality: Appropriate and Attentive      Speech:  normal      Thought Process/Content: Logical      Affective Functioning: Constricted/Restricted      Mood: anxious      Level of consciousness:  Alert and Oriented x4      Response to Learning: Able to verbalize current knowledge/experience, Able to verbalize/acknowledge new learning and Able to retain information      Endings: None Reported    Modes of Intervention: Education, Support, Socialization, Exploration, Clarifying and Problem-solving      Discipline Responsible: /Counselor      Signature:   GLENIS Gonzales

## 2019-05-20 PROCEDURE — 97535 SELF CARE MNGMENT TRAINING: CPT

## 2019-05-20 PROCEDURE — 1240000000 HC EMOTIONAL WELLNESS R&B

## 2019-05-20 PROCEDURE — 6370000000 HC RX 637 (ALT 250 FOR IP): Performed by: PSYCHIATRY & NEUROLOGY

## 2019-05-20 PROCEDURE — 97165 OT EVAL LOW COMPLEX 30 MIN: CPT

## 2019-05-20 PROCEDURE — 6370000000 HC RX 637 (ALT 250 FOR IP): Performed by: NURSE PRACTITIONER

## 2019-05-20 PROCEDURE — 99233 SBSQ HOSP IP/OBS HIGH 50: CPT | Performed by: PSYCHIATRY & NEUROLOGY

## 2019-05-20 RX ORDER — BUSPIRONE HYDROCHLORIDE 10 MG/1
10 TABLET ORAL 2 TIMES DAILY
Status: DISCONTINUED | OUTPATIENT
Start: 2019-05-20 | End: 2019-05-21 | Stop reason: HOSPADM

## 2019-05-20 RX ORDER — BUSPIRONE HYDROCHLORIDE 5 MG/1
5 TABLET ORAL 2 TIMES DAILY
Status: DISCONTINUED | OUTPATIENT
Start: 2019-05-20 | End: 2019-05-21 | Stop reason: HOSPADM

## 2019-05-20 RX ORDER — BUSPIRONE HYDROCHLORIDE 7.5 MG/1
15 TABLET ORAL 2 TIMES DAILY
Status: DISCONTINUED | OUTPATIENT
Start: 2019-05-20 | End: 2019-05-20 | Stop reason: SDUPTHER

## 2019-05-20 RX ADMIN — BUSPIRONE HYDROCHLORIDE 10 MG: 10 TABLET ORAL at 20:52

## 2019-05-20 RX ADMIN — BUSPIRONE HYDROCHLORIDE 5 MG: 5 TABLET ORAL at 20:52

## 2019-05-20 RX ADMIN — BUSPIRONE HYDROCHLORIDE 10 MG: 10 TABLET ORAL at 08:33

## 2019-05-20 RX ADMIN — NICOTINE POLACRILEX 4 MG: 4 GUM, CHEWING BUCCAL at 11:04

## 2019-05-20 RX ADMIN — TRAZODONE HYDROCHLORIDE 50 MG: 50 TABLET ORAL at 20:52

## 2019-05-20 RX ADMIN — NICOTINE POLACRILEX 4 MG: 4 GUM, CHEWING BUCCAL at 17:12

## 2019-05-20 RX ADMIN — RISPERIDONE 2 MG: 2 TABLET ORAL at 08:33

## 2019-05-20 NOTE — PLAN OF CARE
Problem: Suicide risk  Goal: Provide patient with safe environment  Description  Provide patient with safe environment  Outcome: Completed     Problem: Depressive Behavior With or Without Suicide Precautions:  Goal: Absence of self-harm  Description  Absence of self-harm  Outcome: Met This Shift  Note:   Denies having self harm thoughts and contracts for safety. Problem: Anger Management/Homicidal Ideation:  Goal: Absence of angry outbursts  Description  Absence of angry outbursts  Outcome: Met This Shift  Note:   No angry outburst. Has bee brighter. Impulsive at times. Will run in the ma but easily redirected. Attending groups.  Social in milieu

## 2019-05-20 NOTE — PROGRESS NOTES
Department of Psychiatry  Attending Progress Note  Chief Complaint: mood lability  Maureen in good spirits. He talked about his use of meth an dhow it has made his life worse. Apparently his family all use meth and he ends up using with them . He had been on [place] in the past but stopped after receiving it here in Nov 2018. He agreed to restart it. Patient's chart was reviewed and collaborated with  about the treatment plan. SUBJECTIVE:    Patient is feeling better. Suicidal ideation:  denies suicidal ideation. Patient does not have medication side effects. ROS: Patient has new complaints: no  Sleeping adequately:  Yes   Appetite adequate: Yes  Attending groups: Yes  Visitors:No    OBJECTIVE    Physical  VITALS:  BP (!) 114/56   Pulse 101   Temp 98.2 °F (36.8 °C) (Oral)   Resp 16   Ht 5' 11\" (1.803 m)   Wt 155 lb (70.3 kg)   SpO2 98%   BMI 21.62 kg/m²     Mental Status Examination:  Patients appearance was street clothes. Thoughts are Goal directed. Homicidal ideations none. No abnormal movements, tics or mannerisms. Memory intact Aims 0. Concentration Good. Alert and oriented X 4. Insight and Judgement impaired insight. Patient was cooperative.  Patient gait normal. Mood within normal limits, affect normal affect Hallucinations Absent, suicidal ideations no specific plan to harm self Speech normal volume  Data  Labs:   Admission on 05/17/2019   Component Date Value Ref Range Status    WBC 05/17/2019 8.4  4.0 - 11.0 K/uL Final    RBC 05/17/2019 5.66  4.20 - 5.90 M/uL Final    Hemoglobin 05/17/2019 17.0  13.5 - 17.5 g/dL Final    Hematocrit 05/17/2019 50.1  40.5 - 52.5 % Final    MCV 05/17/2019 88.5  80.0 - 100.0 fL Final    MCH 05/17/2019 30.1  26.0 - 34.0 pg Final    MCHC 05/17/2019 34.0  31.0 - 36.0 g/dL Final    RDW 05/17/2019 14.1  12.4 - 15.4 % Final    Platelets 99/56/4357 301  135 - 450 K/uL Final    MPV 05/17/2019 7.2  5.0 - 10.5 fL Final    Neutrophils % 05/17/2019 47.3  % Final    Lymphocytes % 05/17/2019 36.9  % Final    Monocytes % 05/17/2019 11.1  % Final    Eosinophils % 05/17/2019 3.7  % Final    Basophils % 05/17/2019 1.0  % Final    Neutrophils # 05/17/2019 4.0  1.7 - 7.7 K/uL Final    Lymphocytes # 05/17/2019 3.1  1.0 - 5.1 K/uL Final    Monocytes # 05/17/2019 0.9  0.0 - 1.3 K/uL Final    Eosinophils # 05/17/2019 0.3  0.0 - 0.6 K/uL Final    Basophils # 05/17/2019 0.1  0.0 - 0.2 K/uL Final    Sodium 05/17/2019 140  136 - 145 mmol/L Final    Potassium reflex Magnesium 05/17/2019 5.0  3.5 - 5.1 mmol/L Final    Chloride 05/17/2019 99  99 - 110 mmol/L Final    CO2 05/17/2019 30  21 - 32 mmol/L Final    Anion Gap 05/17/2019 11  3 - 16 Final    Glucose 05/17/2019 118* 70 - 99 mg/dL Final    BUN 05/17/2019 15  7 - 20 mg/dL Final    CREATININE 05/17/2019 0.9  0.9 - 1.3 mg/dL Final    GFR Non- 05/17/2019 >60  >60 Final    Comment: >60 mL/min/1.73m2 EGFR, calc. for ages 25 and older using the  MDRD formula (not corrected for weight), is valid for stable  renal function.  GFR  05/17/2019 >60  >60 Final    Comment: Chronic Kidney Disease: less than 60 ml/min/1.73 sq.m. Kidney Failure: less than 15 ml/min/1.73 sq.m. Results valid for patients 18 years and older.       Calcium 05/17/2019 10.6  8.3 - 10.6 mg/dL Final    Total Protein 05/17/2019 8.3* 6.4 - 8.2 g/dL Final    Alb 05/17/2019 5.2* 3.4 - 5.0 g/dL Final    Albumin/Globulin Ratio 05/17/2019 1.7  1.1 - 2.2 Final    Total Bilirubin 05/17/2019 0.8  0.0 - 1.0 mg/dL Final    Alkaline Phosphatase 05/17/2019 99  40 - 129 U/L Final    ALT 05/17/2019 12  10 - 40 U/L Final    AST 05/17/2019 19  15 - 37 U/L Final    Globulin 05/17/2019 3.1  g/dL Final    Acetaminophen Level 05/17/2019 <5* 10 - 30 ug/mL Final    Comment: Therapeutic Range: 10.0-30.0 ug/mL  Toxic: >=004 ug/mL      Salicylate, Serum 07/18/0329 0.4* 15.0 - 30.0 mg/dL Final    Comment: Therapeutic Range: 15.0-30.0 mg/dL  Toxic: >30.0 mg/dL      Ethanol Lvl 05/17/2019 None Detected  mg/dL Final    Comment:    None Detected  Conversion factor:  100 mg/dl = .100 g/dl  For Medical Purposes Only      Amphetamine Screen, Urine 05/17/2019 POSITIVE* Negative <1000ng/mL Final    Comment: High concentrations of ephedrine/pseudoephedrine or  phenylpropanolamine may cause false positive results  for amphetamine. Therefore, confirmatory testing for  amphetamine should be considered if clinically indicated.  Barbiturate Screen, Ur 05/17/2019 Neg  Negative <200 ng/mL Final    Benzodiazepine Screen, Urine 05/17/2019 Neg  Negative <200 ng/mL Final    Cannabinoid Scrn, Ur 05/17/2019 POSITIVE* Negative <50 ng/mL Final    Cocaine Metabolite Screen, Urine 05/17/2019 Neg  Negative <300 ng/mL Final    Opiate Scrn, Ur 05/17/2019 Neg  Negative <300 ng/mL Final    Comment: \"Therapeutic levels of pain medication, especially oxycontin and synthetic  opioids, may not be detected by this Methodology. Pain management screen  panel  Drug panel-PM-Hi Res Ur, Interp (PAIN) should be considered for drug  monitoring \".  PCP Screen, Urine 05/17/2019 Neg  Negative <25 ng/mL Final    Methadone Screen, Urine 05/17/2019 Neg  Negative <300 ng/mL Final    Propoxyphene Scrn, Ur 05/17/2019 Neg  Negative <300 ng/mL Final    pH, UA 05/17/2019 5.0   Final    Comment: Urine pH less than 5.0 or greater than 8.0 may indicate sample adulteration. Another sample should be collected if clinically  indicated.  Drug Screen Comment: 05/17/2019 see below   Final    Comment: This method is a screening test to detect only these drug  classes as part of a medical workup. Confirmatory testing  by another method should be ordered if clinically indicated.       Oxycodone Urine 05/17/2019 Neg  Negative <100 ng/ml Final    Ventricular Rate 05/17/2019 58  BPM Final    Atrial Rate 05/17/2019 58  BPM Final    P-R Interval 05/17/2019 166 ms Final    QRS Duration 05/17/2019 94  ms Final    Q-T Interval 05/17/2019 414  ms Final    QTc Calculation (Bazett) 05/17/2019 406  ms Final    P Axis 05/17/2019 68  degrees Final    R Axis 05/17/2019 85  degrees Final    T Axis 05/17/2019 69  degrees Final    Diagnosis 05/17/2019 Sinus bradycardiaEarly repolarizationOtherwise normal ECGWhen compared with ECG of 02-APR-2019 00:01,No significant change was foundConfirmed by Deandra Johnson MD, [address] ([facility]) on 5/17/2019 7:35:12 PM   Final            Medications  Current Facility-Administered Medications: busPIRone (BUSPAR) tablet 15 mg, 15 mg, Oral, BID  acetaminophen (TYLENOL) tablet 650 mg, 650 mg, Oral, Q4H PRN  hydrOXYzine (VISTARIL) capsule 50 mg, 50 mg, Oral, TID PRN  traZODone (DESYREL) tablet 50 mg, 50 mg, Oral, Nightly PRN  benztropine mesylate (COGENTIN) injection 2 mg, 2 mg, Intramuscular, BID PRN  magnesium hydroxide (MILK OF MAGNESIA) 400 MG/5ML suspension 30 mL, 30 mL, Oral, Daily PRN  aluminum & magnesium hydroxide-simethicone (MAALOX) 200-200-20 MG/5ML suspension 30 mL, 30 mL, Oral, Q6H PRN  nicotine (NICODERM CQ) 14 MG/24HR 1 patch, 1 patch, Transdermal, Daily  nicotine polacrilex (NICORETTE) gum 4 mg, 4 mg, Oral, PRN    ASSESSMENT AND PLAN    Active Problems:    Tobacco abuse    Polysubstance abuse (HCC)    Bipolar disorder, current episode manic severe with psychotic features (HCC)    Amphetamine use disorder, severe, in controlled environment [facility])  Resolved Problems:    * No resolved hospital problems. *       1. Patient s symptoms   are improving. Invega Sustenna 234  Mg IM today   2. Probable discharge is 1-2 days   3. Discharge planning is complete  4. Suicidal ideation is better  5. Total time with patient was 40 minutes and more than 50 % of that time was spent counseling the patient on their symptoms, treatment and expected goals.

## 2019-05-20 NOTE — BH NOTE
Time: 3236-7190      Type of Group: Health and Wellness: Sleep Prep      Level of Participation: Attentive.  Passive       Comments: anxious but able to sit through group

## 2019-05-20 NOTE — GROUP NOTE
Group Therapy Note    Date: May 20    Group Start Time: 1000  Group End Time: [address]  Group Topic: Psychoeducation    [address] [place], [place]        Group Therapy Note    Attendees: 13    Patient's Goal: To increase mood and socialization while engaging in leisure stations activity. Notes: Pt engaged in group activity. Pt socialized with fellow group members. Group discussed resources to leisure in the community. Status After Intervention:  Improved    Participation Level:  Active Listener and Interactive    Participation Quality: Appropriate, Attentive and Sharing      Speech:  normal      Thought Process/Content: Logical      Affective Functioning: Congruent      Mood: depressed      Level of consciousness:  Alert and Oriented x4      Response to Learning: Able to retain information and Capable of insight      Endings: None Reported    Modes of Intervention: Education, Support, Socialization and Activity      Discipline Responsible: Psychoeducational Specialist      Signature:  Patricia Ochoa MA, CTRS

## 2019-05-20 NOTE — GROUP NOTE
Group Therapy Note    Date: May 20    Group Start Time: 1100  Group End Time: 1150  Group Topic: Psychotherapy    [facility]    Loyda Stokes, [facility]        Group Therapy Note    Attendees: 13          Patient's Goal:  Pt will improve interpersonal interactions through group processing and agenda setting.      Notes:  Pt participated in group by listening.      Status After Intervention:  Unchanged    Participation Level: Minimal    Participation Quality: Appropriate and Attentive      Speech:  normal      Thought Process/Content: Linear      Affective Functioning: Congruent      Mood: depressed      Level of consciousness:  Alert      Response to Learning: Able to verbalize current knowledge/experience      Endings: None Reported    Modes of Intervention: Education, Support, Socialization, Exploration, Clarifying, Problem-solving, Activity and Movement      Discipline Responsible: /Counselor      Signature:  Loyda Stokes, [facility]

## 2019-05-20 NOTE — PROGRESS NOTES
Inpatient Occupational Therapy  Evaluation and Treatment    Unit:  [facility]  Date:  5/20/2019  Patient Name:    Randell Johnson  Admitting diagnosis:  Psychosis, unspecified psychosis type [facility]) Carlene Herrera Date:  5/17/2019  Precautions/Restrictions/WB Status/ Lines/ Wounds/ Oxygen:  Up at tolerated  Treatment Time:  13:02-13:40  Treatment Number:  1    Patient Goals for Therapy:  \" Not be getting mad at every little thing so easily. \"      Discharge Recommendations:  Home with assist/supervision as needed      DME needs for discharge:   none     AM-PAC Score:  24     Home Health S4 Level: ? NA   ? Level 1- Standard  ? Level 2- Social  ? Level 3- Safety  ? Level 4- Sick    ACLS:  Mode 5.0  Engaging Abilities and Following Safety Precautions When the Person Can Learn to Improve the Effects of Actions     DESCRIPTION:    22% Cognitive Assistance: The person may live alone with weekly checks to monitor safety and check problem solving effectiveness. With a  the person may be able to work in support employment. [place] in attending regularly scheduled community activities may be expected. 22% standby cognitive assistance is required to anticipate environmental hazards and prevent social conflict. 6% Physical Assistance is needed with fine motor activities. Preadmission Environment:    Pt. Lives with grandmother. Home environment:   Apartment     Steps to enter first floor:     No steps        Equipment owned: none    Preadmission Status / PLOF:  History of falls   No  Pt. Able to drive   No; Transportation provided by [employer]. Pt Fully independent for ADLs/IADLs. Yes    Pt. Fully independent for transfers and gait and walked with:  No Device    Sleep Hygiene:  10 AM- 10 PM;  Pt. Reports quality sleep. Income:  Not employed;  [employer]  Financial Management:  Mother assists with finances.   Leisure Interests:  Hunt deer; draw, read, watch TV, hiking, biking, exercise  Medication Management:  Mother assists with medication management. Mother calls and tells him when to take his medication. Pt. Reports that he misses doses at times. Health Management:  Pt. Dose not know if he has a PCP. Pt. Reports that he does not have a psychiatrist or therapist.  Social Network:  Sister, mother, grandmother  Stressors:  Loud noises; someone repeating themselves over and over. Coping Skills:  \"I don't know. \"    Pain  Yes    Ratin:10  Location:  Lower back  Pain Medicine Status:  Denies need, RN notified. Cognition    A&Ox person, place;  disoriented to date, patient cooperative however presented as agitated with difficulty sitting still. Follows 2 step commands. Upper Extremity ROM:    WFL, pt able to perform all bed mobility, transfers, and gait without ROM limitation. Upper Extremity Strength:    WFL, pt able to perform all bed mobility, transfers, and gait without strength limitation. Upper Extremity Sensation:    No apparent deficits. Upper Extremity Proprioception:    No apparent deficits. Skin Integrity:  WFL     Coordination and Tone:  WFL    Balance  Static Sitting:   Good   Dynamic Sitting:   Good   Static Standing:  Good   Dynamic Standing:  Good     Bed mobility:  Independent  Supine to sit:  Sit to supine:  Scooting to head of bed:  Scooting in sitting:  Rolling:  Bridging:    Transfers:  Independent  Sit to stand:  Stand to sit:  Bed/Chair to/from toilet:    Self Care: Independent  Toileting:  Grooming:  Dressing:    Exercise / Activities Initiated:   Pt. Educated on role of OT. Pt. Participated in:  ADL retraining, ACLS, PLB. Coping, and safety. Assessment of Deficits:   Pt demonstrated decreased activity tolerance, decreased safety awareness, decreased cognition, and decreased ADL/IADL status. Pt. Limited during evaluation by decreased cognition and agitation. At end of evaluation, pt. In gathering room. Goal(s) : To be met in 3 Visits:  1).  Pt. To complete interest checklist.       To be met in 5 Visits:  1).  Pt. To verbalize 3 coping skills. 2). Pt. To complete ADM. 3). Pt. To complete monthly budget with mod assist.  4).  Pt. To demo ability to read prescription bottle and fill out one wks worth of medication in pill organizer. 5). Pt. To verbalize understanding of sleep hygiene education. 6). Pt. To verbalize understanding of 3 communication skills. 7). Pt. To complete daily schedule of healthy activities/routines with min assist.    Rehabilitation Potential:  Good for goals listed above. Strengths for achieving goals include:  PLOF  Barriers to achieving goals include:  decreased cognition and agitation     Plan: To be seen 2-5x/week while in acute care setting for therapeutic exercises/act, ADL retraining, NMR and patient/caregiver ed/instruction.      Timed Code Treatment Minutes:   28  minutes    Total Treatment Time:    38  minutes    Signature and License Number    Pau Brooks OTR/L  #[id number]        If patient discharges from this facility prior to next visit, this note will serve as the Discharge Summary

## 2019-05-21 VITALS
DIASTOLIC BLOOD PRESSURE: 56 MMHG | HEIGHT: 71 IN | SYSTOLIC BLOOD PRESSURE: 110 MMHG | HEART RATE: 84 BPM | OXYGEN SATURATION: 97 % | RESPIRATION RATE: 14 BRPM | TEMPERATURE: 97.6 F | BODY MASS INDEX: 21.7 KG/M2 | WEIGHT: 155 LBS

## 2019-05-21 PROCEDURE — 6370000000 HC RX 637 (ALT 250 FOR IP): Performed by: NURSE PRACTITIONER

## 2019-05-21 PROCEDURE — 6370000000 HC RX 637 (ALT 250 FOR IP): Performed by: PSYCHIATRY & NEUROLOGY

## 2019-05-21 PROCEDURE — 5130000000 HC BRIDGE APPOINTMENT

## 2019-05-21 PROCEDURE — 99239 HOSP IP/OBS DSCHRG MGMT >30: CPT | Performed by: PSYCHIATRY & NEUROLOGY

## 2019-05-21 RX ORDER — BUSPIRONE HYDROCHLORIDE 10 MG/1
10 TABLET ORAL 2 TIMES DAILY
Qty: 60 TABLET | Refills: 0 | Status: SHIPPED | OUTPATIENT
Start: 2019-05-21

## 2019-05-21 RX ADMIN — NICOTINE POLACRILEX 4 MG: 4 GUM, CHEWING BUCCAL at 09:23

## 2019-05-21 RX ADMIN — BUSPIRONE HYDROCHLORIDE 10 MG: 10 TABLET ORAL at 09:23

## 2019-05-21 RX ADMIN — BUSPIRONE HYDROCHLORIDE 5 MG: 5 TABLET ORAL at 09:24

## 2019-05-21 NOTE — PROGRESS NOTES
[facility] Group Nutrition Education Class    Topic: Sugar and how it affects the body    Patient attended weekly nutrition class this week. Participation was: patient joined class approx. snf through but he did engage in conversation with the group. RD provided patient with nutrition handouts during class. Patients were/are encouraged to request that [facility] staff contact RD if they have any additional questions and/or concerns about nutrition materials provided in class today.        [address] [place] [address],[address], [address] [facility]

## 2019-05-21 NOTE — PROGRESS NOTES
Chief Complaint: mood lability    Patients chart was reviewed and collaborated with staff as well around discharge planning. Reviewed with the patient. Dictated discharge summary. At this time patient is not probateable or holdable and is not suicidal/homicidal. Spent 35 minutes on discharge, and more then 50 % of that time was spent with counseling patient on discharge goals.

## 2019-05-21 NOTE — BH NOTE
Group Therapy Note    Date: 5/21/2019  Start Time: 20:00  End Time:  21:00  Number of Participants: 8    Type of Group: Recreational  Wrap up    Dexter De Dios Information  Module Name:  /  Session Number:  /    Patient's Goal:  D/c planning    Notes:  Goal completed    Status After Intervention:  Improved    Participation Level:  Active Listener and Interactive    Participation Quality: Appropriate, Attentive and Sharing      Speech:  pressured      Thought Process/Content: Logical      Affective Functioning: Exaggerated      Mood: anxious      Level of consciousness:  Alert, Oriented x4 and Attentive      Response to Learning: Progressing to goal      Endings: None Reported    Modes of Intervention: Socialization and Problem-solving      Discipline Responsible: Behavorial Health Tech      Signature:  Will Barger

## 2019-05-21 NOTE — BH NOTE
situations (included triggers and roadblocks), if not completed on admission                    ( )  Coping skills (new ways to manage stress, exercise, relaxation techniques, changing routine, distraction), if not completed on admission                                                           ( )  Basic information about quitting (benefits of quitting, techniques in how to quit, available resources, if not completed on admission  ( ) Referral for counseling faxed to [facility]   (x ) Patient refused referral  ( ) Patient refused counseling  ( ) Patient refused smoking cessation medication upon discharge    Metabolic Screening:    No results found for: LABA1C    No results found for: CHOL  No results found for: TRIG  No results found for: HDL  No components found for: LDLCAL  No results found for: [address] [facility] Appointment completed: Reviewed Discharge Instructions with patient. Patient verbalizes understanding and agreement with the discharge plan using the teachback method.      Referral for Outpatient Tobacco Cessation Counseling, upon discharge (guera X if applicable and completed):    ( )  Hospital staff assisted patient to call Quit Line or faxed referral                                   during hospitalization                  ( )  Recognizing danger situations (included triggers and roadblocks), if not completed on admission                    ( )  Coping skills (new ways to manage stress, exercise, relaxation techniques, changing routine, distraction), if not completed on admission                                                           ( )  Basic information about quitting (benefits of quitting, techniques in how to quit, available resources, if not completed on admission  ( ) Referral for counseling faxed to [facility]   (x ) Patient refused referral  ( ) Patient refused counseling  ( ) Patient refused smoking cessation medication upon discharge      Keo RANGEL Tamera Emerson

## 2019-05-21 NOTE — PROGRESS NOTES
Reviewed discharge information with patient including new medications and follow up information. Patient indicated understanding. Patient agreed to  [facility] tomorrow since script is not available at this time. Iraida Borrego

## 2019-05-21 NOTE — GROUP NOTE
Group Therapy Note    Date: May 21    Group Start Time: 1100  Group End Time: 7093  Group Topic: Psychotherapy    [address] [facility] [place]    Group Therapy Note    Attendees: 10    Patient shared and set a goal at the beginning of group to practice a new way of being in group today. Notes: Pt set goal to listen and learn from others during group. Pt was active with peers, talkative during group and was encouraging peers. Status After Intervention:  Improved    Participation Level:  Active Listener and Interactive    Participation Quality: Appropriate and Attentive      Speech:  normal      Thought Process/Content: Logical  Linear      Affective Functioning: Constricted/Restricted      Mood: anxious      Level of consciousness:  Alert and Oriented x4      Response to Learning: Able to verbalize current knowledge/experience, Able to verbalize/acknowledge new learning and Able to retain information       Endings: None Reported    Modes of Intervention: Education, Support, Socialization, Exploration and Clarifying      Discipline Responsible: /Counselor      Signature:  BRIGETTE Fernandes R-DMT

## 2019-05-21 NOTE — GROUP NOTE
Group Therapy Note    Date: May 21    Group Start Time: 1000  Group End Time: 2649  Group Topic: Psychoeducation    [address] [facility], [address]        Group Therapy Note    Attendees: 8    Patient's Goal: To discuss the 5 love languages and applying them to self love. Group engaged in related art activity. Notes: Pt engaged in group discussion and activity. Pt created a self love token and shared with the group. Status After Intervention:  Improved    Participation Level:  Active Listener and Interactive    Participation Quality: Attentive and Sharing      Speech:  normal      Thought Process/Content: Logical      Affective Functioning: Congruent      Mood: anxious      Level of consciousness:  Alert and Oriented x4      Response to Learning: Able to retain information and Capable of insight      Endings: None Reported    Modes of Intervention: Education, Support, Socialization and Activity      Discipline Responsible: Psychoeducational Specialist      Signature:  Stefani Chapa MA, CTRS

## 2019-05-21 NOTE — GROUP NOTE
Group Therapy Note    Date: May 21    Group Start Time: 1430  Group End Time: 3936  Group Topic: [address], [employer]        Group Therapy Note    Attendees: 7         Patient's Goal:  Patient will complete worksheet on acceptance. Will discuss how acceptance in life contributes to positive mental health. Notes:  Patient engaged well in group. Completed the worksheet and discussed. He talked about the need to accept that he has a drug problem so that he can get the help he finally needs. Status After Intervention:  Improved    Participation Level:  Active Listener and Interactive    Participation Quality: Appropriate, Attentive, Sharing and Supportive    Speech:  normal    Thought Process/Content: Logical Linear    Affective Functioning: Congruent    Mood: anxious and depressed    Level of consciousness:  Oriented x4    Response to Learning: Able to verbalize current knowledge/experience    Endings: None Reported    Modes of Intervention: Education, Support, Socialization and Exploration    Discipline Responsible: /Counselor    Signature:  Suzy Pérez [employer]

## 2019-06-20 NOTE — DISCHARGE SUMMARY
[address]                 [address] [facility], operated by [facility]                               DISCHARGE SUMMARY    PATIENT NAME: Augusto Hunter                   :        1995  MED REC NO:   [id number]                          ROOM:       2307  ACCOUNT NO:   [de-identified]                           ADMIT DATE: 2019  PROVIDER:     Marylin Calero. Oliva Greco MD                  DISCHARGE DATE:  2019    DISPOSITION:  The patient will be discharged home. Followup in  outpatient services through [facility]. DISCHARGE MEDICATIONS:  BuSpar 10 mg twice a day and Invega Sustenna 234  mg every 30 days. CONDITION ON DISCHARGE:  Stable. MENTAL STATUS:  The patient is alert and oriented to person, place and  time. No threats to harm self or others. No psychotic symptoms. Insight and judgment improved. Mood was good. Affect was relatively  bright. DISCHARGE DIAGNOSES:  AXIS I:  1. Bipolar affective disorder, hypomania with psychotic features. 2.  Amphetamine use disorder. AXIS II:  Cluster B personality traits. AXIS III:  Unremarkable. AXIS IV:  Moderate. AXIS V:  55. CHIEF COMPLAINT:  Psychosis and suicidal ideation. HISTORY OF PRESENT ILLNESS:  A 58-year-old male presented with paranoia  and increased anger. He snapped with his sister and apparently was very  aggressive verbally. He reported some intent of hurting himself and has  a history of 20 suicide attempts over his lifetime. When he presented,  Dr. Malika Green was the initial psychiatrist involved on 2019. He did appear to be psychomotorically agitated and restless. _____ were  tangential and rambling per his report. HOSPITAL COURSE:  The patient was in the  hospital for four days. The  patient was started on Risperdal 2 mg twice a day and BuSpar 10 mg three  times a day.   During the remainder of his stay, the patient continued to  show mood lability. His mood did improve. He talked about the use of  meth and how it has made his life worse. Apparently all of his family  uses meth, and he is abusing with them. He has been on [place]  in the past but stopped after receiving it on November 2018. He agreed  to restart it. He was given two initiation doses with 234 mg IM  followed by an injection every month through [facility]. Due to the short  stay, his second [place] injection was not given. His Risperdal was discontinued prior to discharge.         Sang Michel MD    D: 06/19/2019 15:40:43       T: 06/20/2019 5:10:04     [id number]  Job#: [id number]     Doc#: [id number]    CC:

## 2019-06-26 ENCOUNTER — HOSPITAL ENCOUNTER (OUTPATIENT)
Dept: NURSING | Age: 24
Setting detail: INFUSION SERIES
End: 2019-06-26
Payer: MEDICARE

## 2023-12-10 ENCOUNTER — HOSPITAL ENCOUNTER (EMERGENCY)
Age: 28
Discharge: HOME | End: 2023-12-10
Payer: MEDICARE

## 2023-12-10 VITALS
HEART RATE: 65 BPM | HEIGHT: 71 IN | TEMPERATURE: 98.4 F | OXYGEN SATURATION: 98 % | RESPIRATION RATE: 16 BRPM | SYSTOLIC BLOOD PRESSURE: 122 MMHG | BODY MASS INDEX: 21.42 KG/M2 | DIASTOLIC BLOOD PRESSURE: 74 MMHG | WEIGHT: 153 LBS

## 2023-12-10 DIAGNOSIS — K02.9 DENTAL CARIES: ICD-10-CM

## 2023-12-10 DIAGNOSIS — K04.7 DENTAL ABSCESS: Primary | ICD-10-CM

## 2023-12-10 PROCEDURE — 99283 EMERGENCY DEPT VISIT LOW MDM: CPT

## 2023-12-10 PROCEDURE — 6370000000 HC RX 637 (ALT 250 FOR IP): Performed by: PHYSICIAN ASSISTANT

## 2023-12-10 RX ORDER — PENICILLIN V POTASSIUM 250 MG/1
500 TABLET ORAL ONCE
Status: COMPLETED | OUTPATIENT
Start: 2023-12-10 | End: 2023-12-10

## 2023-12-10 RX ORDER — IBUPROFEN 800 MG/1
800 TABLET ORAL ONCE
Status: COMPLETED | OUTPATIENT
Start: 2023-12-10 | End: 2023-12-10

## 2023-12-10 RX ORDER — CHLORHEXIDINE GLUCONATE ORAL RINSE 1.2 MG/ML
15 SOLUTION DENTAL 2 TIMES DAILY
Qty: 420 ML | Refills: 0 | Status: SHIPPED | OUTPATIENT
Start: 2023-12-10 | End: 2023-12-24

## 2023-12-10 RX ORDER — PENICILLIN V POTASSIUM 500 MG/1
500 TABLET ORAL 4 TIMES DAILY
Qty: 28 TABLET | Refills: 0 | Status: SHIPPED | OUTPATIENT
Start: 2023-12-10 | End: 2023-12-17

## 2023-12-10 RX ORDER — ACETAMINOPHEN 500 MG
1000 TABLET ORAL ONCE
Status: COMPLETED | OUTPATIENT
Start: 2023-12-10 | End: 2023-12-10

## 2023-12-10 RX ORDER — IBUPROFEN 800 MG/1
800 TABLET ORAL EVERY 8 HOURS PRN
Qty: 20 TABLET | Refills: 0 | Status: SHIPPED | OUTPATIENT
Start: 2023-12-10

## 2023-12-10 RX ADMIN — IBUPROFEN 800 MG: 800 TABLET, FILM COATED ORAL at 11:32

## 2023-12-10 RX ADMIN — PENICILLIN V POTASSIUM 500 MG: 250 TABLET, FILM COATED ORAL at 11:32

## 2023-12-10 RX ADMIN — ACETAMINOPHEN 1000 MG: 500 TABLET ORAL at 11:32

## 2023-12-10 ASSESSMENT — PAIN SCALES - GENERAL: PAINLEVEL_OUTOF10: 8

## 2023-12-10 ASSESSMENT — PAIN DESCRIPTION - PAIN TYPE: TYPE: ACUTE PAIN

## 2023-12-10 ASSESSMENT — PAIN - FUNCTIONAL ASSESSMENT: PAIN_FUNCTIONAL_ASSESSMENT: 0-10

## 2023-12-10 ASSESSMENT — PAIN DESCRIPTION - LOCATION: LOCATION: JAW

## 2023-12-10 NOTE — ED PROVIDER NOTES
Department of Emergency Medicine   ED  Encounter Note  Admit Date/RoomTime: 12/10/2023 10:21 AM  ED Room:     NAME: Jung Damon  : 1995  MRN: [id number]     Chief Complaint:  Facial Swelling (Possible abscess in jaw or tooth)    History of Present Illness        Jung Damon is a 33 y.o. old male who presents to the emergency department via private vehicle with a concern of left-sided jaw pain and swelling. Onset of symptoms 2 days prior to arrival no acute trauma. Patient indicates he has poor teeth at baseline. It has been several years since he has been to a dentist.  Denies any recent injury to his mouth or tooth. Patient's pain has been worsening since initial onset worsened with hot and cold. Not improved by anything. No other associated signs or symptoms. Onset:       Spontaneous:   yes. Following Trauma:   no.     Previous Caries:   yes. Recent Dental Procedure:   no.     ROS   Pertinent positives and negatives are stated within HPI, all other systems reviewed and are negative. Past Medical History:  has a past medical history of ADHD (attention deficit hyperactivity disorder), Bipolar 1 disorder ([address]), Borderline personality disorder ([address]), Depression, and Schizophrenia ([address]). Surgical History:  has a past surgical history that includes Hand surgery. Social History:  reports that he has been smoking cigarettes. He has a 0.50 pack-year smoking history. He has never used smokeless tobacco. He reports that he does not currently use drugs after having used the following drugs: Marijuana Kreg Sjogren). He reports that he does not drink alcohol. Family History: family history includes Asthma in his sister; Diabetes in his mother; Sudden Death in his father.      Allergies: Morphine    Physical Exam   Oxygen Saturation Interpretation: Normal.        ED Triage Vitals [12/10/23 1023]   BP Temp Temp Source Pulse Respirations SpO2 Height Weight - Scale   (!) 121/94

## 2023-12-11 NOTE — PROGRESS NOTES
Call placed to this patient, patient agreed to schedule but call was ended before an appointment could be made

## 2024-05-07 ENCOUNTER — APPOINTMENT (OUTPATIENT)
Dept: GENERAL RADIOLOGY | Age: 29
End: 2024-05-07
Payer: MEDICARE

## 2024-05-07 ENCOUNTER — HOSPITAL ENCOUNTER (EMERGENCY)
Age: 29
Discharge: HOME | End: 2024-05-07
Payer: MEDICARE

## 2024-05-07 VITALS
WEIGHT: 140 LBS | RESPIRATION RATE: 18 BRPM | HEIGHT: 71 IN | SYSTOLIC BLOOD PRESSURE: 121 MMHG | BODY MASS INDEX: 19.6 KG/M2 | HEART RATE: 63 BPM | TEMPERATURE: 97.2 F | OXYGEN SATURATION: 99 % | DIASTOLIC BLOOD PRESSURE: 82 MMHG

## 2024-05-07 DIAGNOSIS — B07.9 VIRAL WART ON FINGER: ICD-10-CM

## 2024-05-07 DIAGNOSIS — S39.012A STRAIN OF LUMBAR REGION, INITIAL ENCOUNTER: Primary | ICD-10-CM

## 2024-05-07 DIAGNOSIS — S62.629A CLOSED AVULSION FRACTURE OF MIDDLE PHALANX OF FINGER, INITIAL ENCOUNTER: ICD-10-CM

## 2024-05-07 PROCEDURE — 6370000000 HC RX 637 (ALT 250 FOR IP): Performed by: PHYSICIAN ASSISTANT

## 2024-05-07 PROCEDURE — 73130 X-RAY EXAM OF HAND: CPT

## 2024-05-07 PROCEDURE — 99283 EMERGENCY DEPT VISIT LOW MDM: CPT

## 2024-05-07 PROCEDURE — 72100 X-RAY EXAM L-S SPINE 2/3 VWS: CPT

## 2024-05-07 RX ORDER — IBUPROFEN 600 MG/1
600 TABLET ORAL
Qty: 40 TABLET | Refills: 0 | Status: SHIPPED | OUTPATIENT
Start: 2024-05-07

## 2024-05-07 RX ORDER — ORPHENADRINE CITRATE 100 MG/1
100 TABLET, EXTENDED RELEASE ORAL 2 TIMES DAILY
Status: DISCONTINUED | OUTPATIENT
Start: 2024-05-07 | End: 2024-05-07 | Stop reason: HOSPADM

## 2024-05-07 RX ORDER — ORPHENADRINE CITRATE 100 MG/1
100 TABLET, EXTENDED RELEASE ORAL 2 TIMES DAILY
Status: DISCONTINUED | OUTPATIENT
Start: 2024-05-07 | End: 2024-05-07

## 2024-05-07 RX ORDER — IBUPROFEN 600 MG/1
600 TABLET ORAL ONCE
Status: COMPLETED | OUTPATIENT
Start: 2024-05-07 | End: 2024-05-07

## 2024-05-07 RX ORDER — ORPHENADRINE CITRATE 100 MG/1
100 TABLET, EXTENDED RELEASE ORAL 2 TIMES DAILY
Qty: 14 TABLET | Refills: 0 | Status: SHIPPED | OUTPATIENT
Start: 2024-05-07 | End: 2024-05-14

## 2024-05-07 RX ADMIN — ORPHENADRINE CITRATE 100 MG: 100 TABLET, EXTENDED RELEASE ORAL at 15:33

## 2024-05-07 RX ADMIN — IBUPROFEN 600 MG: 600 TABLET, FILM COATED ORAL at 15:33

## 2024-05-07 ASSESSMENT — PAIN DESCRIPTION - ORIENTATION
ORIENTATION: RIGHT
ORIENTATION: RIGHT

## 2024-05-07 ASSESSMENT — PAIN DESCRIPTION - LOCATION
LOCATION: FINGER (COMMENT WHICH ONE)
LOCATION: FINGER (COMMENT WHICH ONE);BACK

## 2024-05-07 ASSESSMENT — PAIN - FUNCTIONAL ASSESSMENT: PAIN_FUNCTIONAL_ASSESSMENT: 0-10

## 2024-05-07 ASSESSMENT — PAIN SCALES - GENERAL
PAINLEVEL_OUTOF10: 8
PAINLEVEL_OUTOF10: 8

## 2024-05-07 NOTE — ED NOTES
Finger splint applied to pt R index finger and secured with coban. Pt instructed on use and verbalized understanding.

## 2024-05-07 NOTE — DISCHARGE INSTRUCTIONS
I recommend follow-up with hand surgery specialist Dr. Garcia.  Recommend follow-up with orthopedic spinal specialist Dr. Florian Akhtar or associate.  I have sent prescription to your local pharmacy.  We placed you in a splint.  Wear the splint for the next 3 weeks.  I do recommend cryotherapy on the wart.

## 2024-05-07 NOTE — ED PROVIDER NOTES
intermittent chronic low back strain.  No radicular symptoms to suggest disc issue.  He does have a fracture of the dominant right index finger base middle phalanx volar aspect.  Splint applied.  Motrin and Norflex given in ED.  If some prescription for Norflex and Motrin to his pharmacy.  Referred to Dr. Shun Man and Dr. Elvin Garcia.  Also he will need a PCP referral as he is moved from [place] to this area.  He is with his mother.  Both expressed understanding of the diagnosis and treatment plan.      I am the Primary Clinician of Record.  FINAL IMPRESSION      1. Strain of lumbar region, initial encounter    2. Closed avulsion fracture of middle phalanx of finger, initial encounter    3. Viral wart on finger          DISPOSITION/PLAN     DISPOSITION Decision To Discharge 05/07/2024 03:25:57 PM      PATIENT REFERRED TO:  Elvin Garcia MD  [address] [facility] [id number] [phone]    Schedule an appointment as soon as possible for a visit on 5/10/2024      Florian Akhtar MD  [address] [facility] [address] [phone]    Schedule an appointment as soon as possible for a visit in 3 days      [facility]  [phone]  Schedule an appointment as soon as possible for a visit in 1 week      [facility] ED  [address] [address]  [phone]  Go to   If symptoms worsen      DISCHARGE MEDICATIONS:  New Prescriptions    IBUPROFEN (ADVIL;MOTRIN) 600 MG TABLET    Take 1 tablet by mouth 3 times daily (with meals)    ORPHENADRINE (NORFLEX) 100 MG EXTENDED RELEASE TABLET    Take 1 tablet by mouth 2 times daily for 7 days       DISCONTINUED MEDICATIONS:  Discontinued Medications    IBUPROFEN (ADVIL;MOTRIN) 800 MG TABLET    Take 1 tablet by mouth every 8 hours as needed for Pain              (Please note that portions of this note were completed with a voice recognition program.  Efforts were made to edit the dictations but occasionally

## 2024-05-08 ENCOUNTER — TELEPHONE (OUTPATIENT)
Dept: ORTHOPEDIC SURGERY | Age: 29
End: 2024-05-08

## 2024-05-08 NOTE — TELEPHONE ENCOUNTER
Did leave message regarding ED referral for an appointment. Upon return call please schedule with Dr. aGrcia.

## 2024-05-09 ENCOUNTER — TELEPHONE (OUTPATIENT)
Dept: ORTHOPEDIC SURGERY | Age: 29
End: 2024-05-09

## 2024-05-09 NOTE — TELEPHONE ENCOUNTER
Per patient he will call back later for an appointment, currently at work. Upon return call please schedule with Dr. Garcia.

## 2024-10-22 ENCOUNTER — APPOINTMENT (OUTPATIENT)
Dept: CT IMAGING | Age: 29
End: 2024-10-22
Payer: MEDICARE

## 2024-10-22 ENCOUNTER — HOSPITAL ENCOUNTER (EMERGENCY)
Age: 29
Discharge: HOME | End: 2024-10-22
Payer: MEDICARE

## 2024-10-22 VITALS
HEART RATE: 78 BPM | OXYGEN SATURATION: 97 % | SYSTOLIC BLOOD PRESSURE: 118 MMHG | DIASTOLIC BLOOD PRESSURE: 75 MMHG | HEIGHT: 70 IN | BODY MASS INDEX: 20.83 KG/M2 | WEIGHT: 145.5 LBS | RESPIRATION RATE: 14 BRPM | TEMPERATURE: 98 F

## 2024-10-22 DIAGNOSIS — K04.7 DENTAL INFECTION: ICD-10-CM

## 2024-10-22 DIAGNOSIS — K04.7 DENTAL ABSCESS: Primary | ICD-10-CM

## 2024-10-22 DIAGNOSIS — K08.89 PAIN, DENTAL: ICD-10-CM

## 2024-10-22 LAB
ALBUMIN SERPL-MCNC: 4.7 G/DL (ref 3.4–5)
ALBUMIN/GLOB SERPL: 1.6 {RATIO} (ref 1.1–2.2)
ALP SERPL-CCNC: 76 U/L (ref 40–129)
ALT SERPL-CCNC: 9 U/L (ref 10–40)
ANION GAP SERPL CALCULATED.3IONS-SCNC: 13 MMOL/L (ref 3–16)
AST SERPL-CCNC: 23 U/L (ref 15–37)
BASOPHILS # BLD: 0.1 K/UL (ref 0–0.2)
BASOPHILS NFR BLD: 0.7 %
BILIRUB SERPL-MCNC: 0.5 MG/DL (ref 0–1)
BUN SERPL-MCNC: 13 MG/DL (ref 7–20)
CALCIUM SERPL-MCNC: 10 MG/DL (ref 8.3–10.6)
CHLORIDE SERPL-SCNC: 101 MMOL/L (ref 99–110)
CO2 SERPL-SCNC: 27 MMOL/L (ref 21–32)
CREAT SERPL-MCNC: 0.8 MG/DL (ref 0.9–1.3)
DEPRECATED RDW RBC AUTO: 13.7 % (ref 12.4–15.4)
EOSINOPHIL # BLD: 0.1 K/UL (ref 0–0.6)
EOSINOPHIL NFR BLD: 0.9 %
GFR SERPLBLD CREATININE-BSD FMLA CKD-EPI: >90 ML/MIN/{1.73_M2}
GLUCOSE SERPL-MCNC: 89 MG/DL (ref 70–99)
HCT VFR BLD AUTO: 46.8 % (ref 40.5–52.5)
HGB BLD-MCNC: 15.2 G/DL (ref 13.5–17.5)
LYMPHOCYTES # BLD: 2.4 K/UL (ref 1–5.1)
LYMPHOCYTES NFR BLD: 19.8 %
MCH RBC QN AUTO: 29.4 PG (ref 26–34)
MCHC RBC AUTO-ENTMCNC: 32.4 G/DL (ref 31–36)
MCV RBC AUTO: 90.6 FL (ref 80–100)
MONOCYTES # BLD: 1.2 K/UL (ref 0–1.3)
MONOCYTES NFR BLD: 10.1 %
NEUTROPHILS # BLD: 8.2 K/UL (ref 1.7–7.7)
NEUTROPHILS NFR BLD: 68.5 %
PLATELET # BLD AUTO: 284 K/UL (ref 135–450)
PMV BLD AUTO: 7.4 FL (ref 5–10.5)
POTASSIUM SERPL-SCNC: 4.2 MMOL/L (ref 3.5–5.1)
PROT SERPL-MCNC: 7.7 G/DL (ref 6.4–8.2)
RBC # BLD AUTO: 5.16 M/UL (ref 4.2–5.9)
SODIUM SERPL-SCNC: 141 MMOL/L (ref 136–145)
WBC # BLD AUTO: 12 K/UL (ref 4–11)

## 2024-10-22 PROCEDURE — 99285 EMERGENCY DEPT VISIT HI MDM: CPT

## 2024-10-22 PROCEDURE — 85025 COMPLETE CBC W/AUTO DIFF WBC: CPT

## 2024-10-22 PROCEDURE — 70491 CT SOFT TISSUE NECK W/DYE: CPT

## 2024-10-22 PROCEDURE — 80053 COMPREHEN METABOLIC PANEL: CPT

## 2024-10-22 PROCEDURE — 6360000004 HC RX CONTRAST MEDICATION

## 2024-10-22 PROCEDURE — 6370000000 HC RX 637 (ALT 250 FOR IP)

## 2024-10-22 PROCEDURE — 41800 DRAINAGE OF GUM LESION: CPT

## 2024-10-22 RX ORDER — CLINDAMYCIN HYDROCHLORIDE 150 MG/1
300 CAPSULE ORAL ONCE
Status: COMPLETED | OUTPATIENT
Start: 2024-10-22 | End: 2024-10-22

## 2024-10-22 RX ORDER — NAPROXEN 500 MG/1
500 TABLET ORAL 2 TIMES DAILY PRN
Qty: 30 TABLET | Refills: 0 | Status: SHIPPED | OUTPATIENT
Start: 2024-10-22

## 2024-10-22 RX ORDER — CLINDAMYCIN HYDROCHLORIDE 300 MG/1
300 CAPSULE ORAL 3 TIMES DAILY
Qty: 30 CAPSULE | Refills: 0 | Status: SHIPPED | OUTPATIENT
Start: 2024-10-22 | End: 2024-11-01

## 2024-10-22 RX ORDER — IOPAMIDOL 755 MG/ML
75 INJECTION, SOLUTION INTRAVASCULAR
Status: COMPLETED | OUTPATIENT
Start: 2024-10-22 | End: 2024-10-22

## 2024-10-22 RX ORDER — NAPROXEN 250 MG/1
500 TABLET ORAL ONCE
Status: COMPLETED | OUTPATIENT
Start: 2024-10-22 | End: 2024-10-22

## 2024-10-22 RX ADMIN — NAPROXEN 500 MG: 250 TABLET ORAL at 14:05

## 2024-10-22 RX ADMIN — IOPAMIDOL 75 ML: 755 INJECTION, SOLUTION INTRAVENOUS at 13:13

## 2024-10-22 RX ADMIN — CLINDAMYCIN HYDROCHLORIDE 300 MG: 150 CAPSULE ORAL at 14:05

## 2024-10-22 ASSESSMENT — LIFESTYLE VARIABLES
HOW MANY STANDARD DRINKS CONTAINING ALCOHOL DO YOU HAVE ON A TYPICAL DAY: PATIENT DOES NOT DRINK
HOW OFTEN DO YOU HAVE A DRINK CONTAINING ALCOHOL: NEVER

## 2024-10-22 ASSESSMENT — PAIN SCALES - GENERAL: PAINLEVEL_OUTOF10: 8

## 2024-10-22 ASSESSMENT — PAIN - FUNCTIONAL ASSESSMENT: PAIN_FUNCTIONAL_ASSESSMENT: 0-10

## 2024-10-23 NOTE — ED PROVIDER NOTES
[facility]  ED  Emergency Department Encounter    Patient Name: Dimas Garcia  MRN: [id number]  YOB: 1995  Date of Evaluation: 10/22/2024  Provider: No primary care provider on file.  Note Started: 11:54 AM EDT 10/23/24    CHIEF COMPLAINT  Oral Swelling (Patient woke today with swelling to the left lower tooth pain and swelling)    SHARED SERVICE VISIT  ELIDA. I have evaluated this patient.      HISTORY OF PRESENT ILLNESS  History From: Patient    Limitations to history : None    Social Determinants Significantly Affecting Health : None    Dimas Garcia is a 29 y.o. male who presents to the ED for evaluation of dental pain and facial swelling.  Patient states that he has noticed some left lower tooth pain and swelling over the past couple of days and upon waking this morning he noticed significant worsening in the swelling to the point that he has been having difficulty chewing and pain with moving his jaw.  Patient denies any difficulty swallowing.  Patient does admit to some intermittent chills and nausea at times however denies any vomiting.  Patient denies chest pain and shortness of breath..    No other complaints, modifying factors or associated symptoms.     Nursing notes reviewed were all reviewed and agreed with or any disagreements were addressed in the HPI.    PMH:  Past Medical History:   Diagnosis Date    ADHD (attention deficit hyperactivity disorder)     Bipolar 1 disorder (HCC)     Borderline personality disorder (HCC)     Depression     Schizophrenia (HCC)      Surgical History:  Past Surgical History:   Procedure Laterality Date    HAND SURGERY       Family History:  Family History   Problem Relation Age of Onset    Sudden Death Father         suicide    Diabetes Mother     Asthma Sister      Social History:  Social History     Socioeconomic History    Marital status: Single     Spouse name: n/a    Number of children: 0    Years of education: 12    Highest